# Patient Record
Sex: FEMALE | Race: BLACK OR AFRICAN AMERICAN | Employment: FULL TIME | URBAN - METROPOLITAN AREA
[De-identification: names, ages, dates, MRNs, and addresses within clinical notes are randomized per-mention and may not be internally consistent; named-entity substitution may affect disease eponyms.]

---

## 2017-02-16 ENCOUNTER — TELEPHONE (OUTPATIENT)
Dept: INTERNAL MEDICINE CLINIC | Age: 47
End: 2017-02-16

## 2017-02-27 ENCOUNTER — OFFICE VISIT (OUTPATIENT)
Dept: INTERNAL MEDICINE CLINIC | Age: 47
End: 2017-02-27

## 2017-02-27 VITALS
RESPIRATION RATE: 18 BRPM | TEMPERATURE: 98.7 F | WEIGHT: 263.6 LBS | SYSTOLIC BLOOD PRESSURE: 120 MMHG | HEART RATE: 94 BPM | DIASTOLIC BLOOD PRESSURE: 74 MMHG | BODY MASS INDEX: 48.51 KG/M2 | OXYGEN SATURATION: 98 % | HEIGHT: 62 IN

## 2017-02-27 DIAGNOSIS — G44.89 OTHER HEADACHE SYNDROME: ICD-10-CM

## 2017-02-27 DIAGNOSIS — J30.89 NON-SEASONAL ALLERGIC RHINITIS DUE TO OTHER ALLERGIC TRIGGER: ICD-10-CM

## 2017-02-27 DIAGNOSIS — E28.2 PCOS (POLYCYSTIC OVARIAN SYNDROME): Primary | ICD-10-CM

## 2017-02-27 DIAGNOSIS — R45.4 IRRITABILITY: ICD-10-CM

## 2017-02-27 DIAGNOSIS — Z12.31 ENCOUNTER FOR MAMMOGRAM TO ESTABLISH BASELINE MAMMOGRAM: ICD-10-CM

## 2017-02-27 DIAGNOSIS — R06.83 SNORING: ICD-10-CM

## 2017-02-27 DIAGNOSIS — E66.01 MORBID OBESITY WITH BMI OF 45.0-49.9, ADULT (HCC): ICD-10-CM

## 2017-02-27 RX ORDER — METFORMIN HYDROCHLORIDE 500 MG/1
TABLET, EXTENDED RELEASE ORAL
Qty: 60 TAB | Refills: 2 | Status: SHIPPED | OUTPATIENT
Start: 2017-02-27 | End: 2017-08-06 | Stop reason: SDUPTHER

## 2017-02-27 RX ORDER — CETIRIZINE HCL 10 MG
10 TABLET ORAL DAILY
Qty: 30 TAB | Refills: 1 | Status: SHIPPED | OUTPATIENT
Start: 2017-02-27 | End: 2022-08-04

## 2017-02-27 NOTE — PROGRESS NOTES
HISTORY OF PRESENT ILLNESS  Ashleigh Ureña is a 49 y.o. female. HPI  Cardiovascular Review:  The patient has obesity and PCOS . She went to [facility] obesity clinic records reviewed. Diet and Lifestyle: nonsmoker, plans to start a VLCD but has not started yet March 10th  Home BP Monitoring: is not measured at home. Pertinent ROS: not taking medications regularly as instructed- stopped metformin for a while but has restarted it, no medication side effects noted, no TIA's, no chest pain on exertion, no dyspnea on exertion, no swelling of ankles. Mood Disorder   Patient is seen for followup of irritability. High stress due to new job. Never been treated for mood disorder. she denies suicidal thoughts without plan. she experiences the following side effects from the treatment: none. PHQ 2 / 9, over the last two weeks 2/27/2017   Little interest or pleasure in doing things Not at all   Feeling down, depressed or hopeless Not at all   Total Score PHQ 2 0       SHx: New job  Review of Systems   Constitutional: Negative for chills, diaphoresis, fever, malaise/fatigue and weight loss. HENT: Positive for congestion. Negative for ear discharge, ear pain and sore throat. No sinus pain   Eyes: Negative for blurred vision and pain. Respiratory: Negative for cough, sputum production and shortness of breath. Cardiovascular: Negative for chest pain, orthopnea and leg swelling. Gastrointestinal: Negative for diarrhea, nausea and vomiting. Musculoskeletal: Positive for joint pain. Negative for myalgias. Skin: Negative for rash. Neurological: Negative for focal weakness and headaches. Psychiatric/Behavioral: Negative for depression.         Per hpi       Patient Active Problem List    Diagnosis Date Noted    Obesity 11/24/2014    GERD (gastroesophageal reflux disease) 11/24/2014    PCOS (polycystic ovarian syndrome) 11/24/2014       Current Outpatient Prescriptions   Medication Sig Dispense Refill  metFORMIN ER (GLUCOPHAGE XR) 500 mg tablet TAKE 2 TABS BY MOUTH DAILY (WITH DINNER). 60 Tab 0    MULTIVITAMIN PO Take 2 Tabs by mouth daily.  fluticasone (FLONASE) 50 mcg/actuation nasal spray 2 Sprays by Both Nostrils route daily. 1 Bottle 2    cetirizine (ZYRTEC) 10 mg tablet Take 1 Tab by mouth daily. 30 Tab 1       No Known Allergies   Visit Vitals    /74 (BP 1 Location: Right arm, BP Patient Position: Sitting)    Pulse 94    Temp 98.7 °F (37.1 °C) (Oral)    Resp 18    Ht 5' 2\" (1.575 m)    Wt 263 lb 9.6 oz (119.6 kg)    SpO2 98%    BMI 48.21 kg/m2       Physical Exam   Constitutional: She appears well-developed. No distress. HENT:   Nose: Mucosal edema present. No rhinorrhea or septal deviation. Right sinus exhibits no maxillary sinus tenderness and no frontal sinus tenderness. Left sinus exhibits no maxillary sinus tenderness and no frontal sinus tenderness. Mouth/Throat:       Eyes: Conjunctivae are normal.   Neck: Neck supple. Cardiovascular: Normal rate, regular rhythm and normal heart sounds. Pulmonary/Chest: Effort normal and breath sounds normal. No respiratory distress. She has no wheezes. She has no rales. She exhibits no tenderness. Lymphadenopathy:     She has no cervical adenopathy. ASSESSMENT and PLAN  Ke Wilcox was seen today for pcos. Diagnoses and all orders for this visit:    PCOS (polycystic ovarian syndrome)- continue metformin  -     cetirizine (ZYRTEC) 10 mg tablet; Take 1 Tab by mouth daily. -     metFORMIN ER (GLUCOPHAGE XR) 500 mg tablet; TAKE 2 TABS BY MOUTH DAILY (WITH DINNER). Allergic rhinitis- optimize sx. Allergic Rhinitis   -Use Flonase 1-2 sprays per nostril once a day  -Use nasal saline spray 3-4 times/day BEFORE Flonase  -Start taking a non sedating antihistamine such as Claritin, Allegra or Zyrtec (generic is fine)    -     cetirizine (ZYRTEC) 10 mg tablet; Take 1 Tab by mouth daily. Other headache syndrome- due to allergies. -     cetirizine (ZYRTEC) 10 mg tablet; Take 1 Tab by mouth daily. -     metFORMIN ER (GLUCOPHAGE XR) 500 mg tablet; TAKE 2 TABS BY MOUTH DAILY (WITH DINNER). Irritability- no si/ hi. Recommended pt try finding a therapist using the list provided and www. psychologytoday.com.  -     REFERRAL TO PSYCHOLOGY    Snoring- r/o JESSY at risk  -     SLEEP MEDICINE REFERRAL    Morbid obesity with BMI of 45.0-49.9, adult ([facility] [address]- I have reviewed/discussed the above normal BMI with the patient. I have recommended the following interventions: dietary management education, guidance, and counseling . The plan is as follows: Referral to [address] [place] [address]  I have counseled this patient on diet and exercise regimens. .        Encounter for mammogram to establish baseline mammogram  -     [facility] MAMMO BI SCREENING INCL CAD; Future      Follow-up Disposition:  Return in about 3 months (around 5/27/2017) for Physical - 30 minute appointment. Medication risks/benefits/costs/interactions/alternatives discussed with patient. Nader Hidalgo  was given an after visit summary which includes diagnoses, current medications, & vitals. she expressed understanding with the diagnosis and plan.

## 2017-02-27 NOTE — MR AVS SNAPSHOT
Visit Information Date & Time Provider Department Dept. Phone Encounter #  
 2/27/2017  1:30 PM Erna Narvaez MD [facility] Internal Medicine [phone] [id number] Follow-up Instructions Return in about 3 months (around 5/27/2017) for Physical - 30 minute appointment. Upcoming Health Maintenance Date Due  
 BREAST CANCER SCRN MAMMOGRAM 12/4/2015 INFLUENZA AGE 9 TO ADULT 8/1/2016 PAP AKA CERVICAL CYTOLOGY 2/11/2018 DTaP/Tdap/Td series (2 - Td) 4/20/2026 Allergies as of 2/27/2017  Review Complete On: 2/27/2017 By: Erna Narvaez MD  
 No Known Allergies Current Immunizations  Reviewed on 8/5/2015 Name Date Influenza Vaccine 10/15/2014 Tdap 4/20/2016 Not reviewed this visit You Were Diagnosed With   
  
 Codes Comments PCOS (polycystic ovarian syndrome)    -  Primary ICD-10-CM: E28.2 ICD-9-CM: 256.4 Other headache syndrome     ICD-10-CM: G44.89 ICD-9-CM: 339.89 Irritability     ICD-10-CM: R45.4 ICD-9-CM: 799.22 Snoring     ICD-10-CM: R06.83 
ICD-9-CM: 786.09 Morbid obesity with BMI of 45.0-49.9, adult (HCC)     ICD-10-CM: E66.01, Z68.42 
ICD-9-CM: 278.01, V85.42 Encounter for mammogram to establish baseline mammogram     ICD-10-CM: Z12.31 
ICD-9-CM: V76.12 Non-seasonal allergic rhinitis due to other allergic trigger     ICD-10-CM: J30.89 Vitals BP  
  
  
  
  
  
 120/74 (BP 1 Location: Right arm, BP Patient Position: Sitting) Vitals History BMI and BSA Data Body Mass Index Body Surface Area  
 48.21 kg/m 2 2.29 m 2 Preferred Pharmacy Pharmacy Name Phone [id number] [address] [phone] Your Updated Medication List  
  
   
This list is accurate as of: 2/27/17  2:03 PM.  Always use your most recent med list.  
  
  
  
  
 cetirizine 10 mg tablet Commonly known as:  ZYRTEC Take 1 Tab by mouth daily. fluticasone 50 mcg/actuation nasal spray Commonly known as:  Anderson Arshad 2 Sprays by Both Nostrils route daily. metFORMIN  mg tablet Commonly known as:  GLUCOPHAGE XR  
TAKE 2 TABS BY MOUTH DAILY (WITH DINNER). MULTIVITAMIN PO Take 2 Tabs by mouth daily. Prescriptions Sent to Pharmacy Refills  
 cetirizine (ZYRTEC) 10 mg tablet 1 Sig: Take 1 Tab by mouth daily. Class: Normal  
 Pharmacy: [address] [place] [address] Ph #: [phone] Route: Oral  
 metFORMIN ER (GLUCOPHAGE XR) 500 mg tablet 2 Sig: TAKE 2 TABS BY MOUTH DAILY (WITH DINNER). Class: Normal  
 Pharmacy: [address] [place] [address] Ph #: [phone] We Performed the Following REFERRAL TO PSYCHOLOGY [YLX47 Custom] SLEEP MEDICINE REFERRAL [ICH086 Custom] Comments:  
 Orders: 
Direct Referral for Study - Please arrange a sleep study consult only if sleep study is positive. Follow-up Instructions Return in about 3 months (around 5/27/2017) for Physical - 30 minute appointment. To-Do List   
 02/27/2017 Imaging:  [facility] MAMMO BI SCREENING INCL CAD Referral Information Referral ID Referred By Referred To [id number] Berenice Hendrickson, [address] [place] [address] Phone: [phone] Visits Status Start Date End Date 1 New Request 2/27/17 2/27/18 If your referral has a status of pending review or denied, additional information will be sent to support the outcome of this decision. Referral ID Referred By Referred To [id number] Nicanor Carbajal MD  
   [address] [place] [address] Phone: [phone] Fax: [phone] Visits Status Start Date End Date 1 New Request 2/27/17 2/27/18 If your referral has a status of pending review or denied, additional information will be sent to support the outcome of this decision. Patient Instructions It was a pleasure to see you! As discussed: Allergic Rhinitis  
-Use Flonase 1-2 sprays per nostril once a day 
-Use nasal saline spray 3-4 times/day BEFORE Flonase 
-Start taking a non sedating antihistamine such as Claritin, Allegra or Zyrtec (generic is fine) Congratulations on your weight loss and positive lifestyle changes!!! 
I recommend you Schedule a sleep study Try finding a therapist using the list provided and www. psychologytoIdeal Binary.tuta.co. GABY MACHADO SHAUNA [facility] Address: [facility] [place], [address] [phone] Marilyn [facility] 
[address] [address] Minnie Chavis 
[phone] In the interim try the suggestions below Work-Life Balance: Care Instructions Your Care Instructions Do you ever feel like there is not enough time to do all of the things you have to do, and no time at all for the things you enjoy? If so, you are not alone. On average, people in the [place] have worked more and more hours each year since 1970. But in recent years, fewer people say they want to take on more at work, even if they would get promoted or get paid more money. More and more workers say they want time to spend with their families and to do things that are important to them. Do you ever feel: · That you always have more and more work to do at your job? · That too many people depend on you every day? · That you never have enough time for your family or friends? · That you never have time for hobbies or things you enjoy? · That each second of your day is scheduled? If you answered \"yes\" to any of these questions, take steps at work and at home to get your life into balance. Follow-up care is a key part of your treatment and safety.  Be sure to make and go to all appointments, and call your doctor if you are having problems. How can you care for yourself at home? Manage your time · Focus on the important things. Taking on too much can wear you out. Look at how you spend your time, and redirect your focus. Learn to say \"no\" and let go of things that do not matter. · Set one small goal at a time. Use a day planner. Break large projects into smaller ones. · Ask for help. Let your children, your spouse, your coworkers, and other people in your life help you get things done. · Leave your job at the office. If you give up free time to get more work done, you may pay for it with stress. If your job offers a flexible work schedule, use it to fit your own work style. For instance, come in earlier to have a longer lunch break, or make time for a yoga class or workout during your workday. · Unplug. Do not let technology (such as your cell phone or the Internet) erase the line between your time and your employer's time. Lower job stress Job stress causes trouble at work and at home. At work, you may worry about things you have not had time to do at home. At home, you may worry about your job. This cycle upsets your work-life balance. Lowering your job stress can get your life back in balance. Job stress can be caused by: · Pressure and deadlines. · Heavy workloads or long hours. · Not being allowed to make decisions. · Health and safety hazards. · Feeling you may lose your job. · Unclear or changing job duties. · Too much responsibility. · Work that is very tiring or boring. Do any of these things bother you? Consider talking with your boss to change things. There are some things that you may not be able to control. But even a few small changes might help lower your stress. Take advantage of programs at work Businesses make money and are better off in other ways if their employees are healthy and happy.  For this reason, many companies have programs to help balance work life and home life. These programs may include: · Flexible schedules and hours. · Time off for family reasons, education, or community service. · Being able to work from home. · Employee assistance programs to provide counseling. · Child-care programs. Check to see if your company has any of these or other programs that could help you. If not, consider talking to your boss about why work-life balance programs make good business sense. Even if your company does not start an official program, you may be able to get flexible hours, time off, or the ability to do some work from home. Know when to quit If you are truly unhappy because of a stressful job, and if the suggestions here have not worked, it may be time to think about changing jobs or changing careers. But before you quit, take time to research your options. Where can you learn more? Go to http://henrietta-dwight.info/. Enter G672 in the search box to learn more about \"Work-Life Balance: Care Instructions. \" Current as of: July 26, 2016 Content Version: 11.1 © 5016-7647 Healthwise, Incorporated. Care instructions adapted under license by [employer] (which disclaims liability or warranty for this information). If you have questions about a medical condition or this instruction, always ask your healthcare professional. [address] any warranty or liability for your use of this information. Introducing [facility]! Dear Cristian Youngblood: Thank you for requesting a Me[employer] account. Our records indicate that you already have an active Me[employer] account. You can access your account anytime at [email or web address] Did you know that you can access your hospital and ER discharge instructions at any time in Me[employer]urMobile? You can also review all of your test results from your hospital stay or ER visit. Additional Information If you have questions, please visit the Frequently Asked Questions section of the [employer] website at [email or web address] Remember, [employer] is NOT to be used for urgent needs. For medical emergencies, dial 911. Now available from your iPhone and Android! Please provide this summary of care documentation to your next provider. Your primary care clinician is listed as Eli Dillard. If you have any questions after today's visit, please call [phone].

## 2017-03-13 ENCOUNTER — HOSPITAL ENCOUNTER (OUTPATIENT)
Dept: MAMMOGRAPHY | Age: 47
Discharge: HOME | End: 2017-03-13
Attending: INTERNAL MEDICINE
Payer: COMMERCIAL

## 2017-03-13 DIAGNOSIS — Z12.31 ENCOUNTER FOR MAMMOGRAM TO ESTABLISH BASELINE MAMMOGRAM: ICD-10-CM

## 2017-03-13 PROCEDURE — 77067 SCR MAMMO BI INCL CAD: CPT

## 2017-03-14 NOTE — PROGRESS NOTES
Reviewed. Normal. Patient is to be contacted by Radiology. --  Dear Feliberto Johnson   Thank you for completing your mammogram.  Please find your most recent results below. Your results are normal. We will repeat the screening in 1 year. In the interim, continue to perform monthly self breast exams and notify me if you have any suspicious findings. Do not hesitate to contact the office if you have any questions or concerns before your next appointment.      Kind regards,   Dr. Tana Kocher    ----  Caryle Macho, MD  Internal Medicine/ [facility] [address] [place] [address] Internal Medicine   [address], [address] [address]  Office: [phone]

## 2017-06-02 ENCOUNTER — OFFICE VISIT (OUTPATIENT)
Dept: INTERNAL MEDICINE CLINIC | Age: 47
End: 2017-06-02

## 2017-06-02 VITALS
HEIGHT: 63 IN | DIASTOLIC BLOOD PRESSURE: 80 MMHG | WEIGHT: 263.4 LBS | RESPIRATION RATE: 18 BRPM | TEMPERATURE: 98.4 F | BODY MASS INDEX: 46.67 KG/M2 | SYSTOLIC BLOOD PRESSURE: 112 MMHG | HEART RATE: 81 BPM | OXYGEN SATURATION: 98 %

## 2017-06-02 DIAGNOSIS — G47.00 INSOMNIA, UNSPECIFIED TYPE: ICD-10-CM

## 2017-06-02 DIAGNOSIS — E66.01 OBESITY, MORBID, BMI 40.0-49.9 (HCC): ICD-10-CM

## 2017-06-02 DIAGNOSIS — E28.2 PCOS (POLYCYSTIC OVARIAN SYNDROME): ICD-10-CM

## 2017-06-02 DIAGNOSIS — Z00.00 WELL WOMAN EXAM (NO GYNECOLOGICAL EXAM): Primary | ICD-10-CM

## 2017-06-02 NOTE — PROGRESS NOTES
HISTORY OF PRESENT ILLNESS  Altaf Mota is a 49 y.o. female. HPI   Health Maintenance   Topic Date Due    INFLUENZA AGE 9 TO ADULT  08/01/2017    PAP AKA CERVICAL CYTOLOGY  02/11/2018    BREAST CANCER SCRN MAMMOGRAM  03/13/2018    DTaP/Tdap/Td series (2 - Td) 04/20/2026     Cardiovascular Review:  The patient has obesity Body mass index is 46.09 kg/(m^2). has joined [facility] Loss program but is having difficulty  and PCOS. Diet and Lifestyle: nonsmoker   Sleep: Not sleeping well.    Stress:   Home BP Monitoring: is not measured at home. Pertinent ROS: taking medications as instructed, no medication side effects noted, no TIA's, no chest pain on exertion, no dyspnea on exertion, no swelling of ankles. Review of Systems   Constitutional: Negative for chills, fever and weight loss. HENT: Negative for congestion and sore throat. Eyes: Negative for blurred vision and double vision. Respiratory: Negative for cough and shortness of breath. Cardiovascular: Negative for chest pain, orthopnea and leg swelling. Gastrointestinal: Negative for abdominal pain, blood in stool, constipation, diarrhea, heartburn, nausea and vomiting. Genitourinary: Negative for frequency and urgency. Musculoskeletal: Negative for joint pain and myalgias. Neurological: Negative for dizziness, tremors, weakness and headaches. Endo/Heme/Allergies: Does not bruise/bleed easily. Psychiatric/Behavioral: Negative for depression and suicidal ideas. Patient Active Problem List    Diagnosis Date Noted    Obesity 11/24/2014    GERD (gastroesophageal reflux disease) 11/24/2014    PCOS (polycystic ovarian syndrome) 11/24/2014       Current Outpatient Prescriptions   Medication Sig Dispense Refill    cetirizine (ZYRTEC) 10 mg tablet Take 1 Tab by mouth daily. 30 Tab 1    metFORMIN ER (GLUCOPHAGE XR) 500 mg tablet TAKE 2 TABS BY MOUTH DAILY (WITH DINNER).  60 Tab 2    MULTIVITAMIN PO Take 2 Tabs by mouth daily.      fluticasone (FLONASE) 50 mcg/actuation nasal spray 2 Sprays by Both Nostrils route daily. 1 Bottle 2       No Known Allergies   Visit Vitals    /80 (BP 1 Location: Right arm, BP Patient Position: Sitting)    Pulse 81    Temp 98.4 °F (36.9 °C) (Oral)    Resp 18    Ht 5' 3.39\" (1.61 m)    Wt 263 lb 6.4 oz (119.5 kg)    SpO2 98%    BMI 46.09 kg/m2       Physical Exam   Constitutional: She is oriented to person, place, and time. She appears well-developed and well-nourished. No distress. HENT:   Right Ear: External ear normal.   Left Ear: External ear normal.   Mouth/Throat: Oropharynx is clear and moist. No oropharyngeal exudate. Eyes: Conjunctivae are normal. No scleral icterus. Neck: Normal range of motion. Neck supple. No thyromegaly present. Cardiovascular: Normal rate, regular rhythm and normal heart sounds. Exam reveals no gallop and no friction rub. No murmur heard. Pulmonary/Chest: Effort normal and breath sounds normal. No respiratory distress. She has no wheezes. She has no rales. She exhibits no tenderness. Abdominal: Soft. Bowel sounds are normal. She exhibits no distension. There is no tenderness. There is no rebound and no guarding. Genitourinary: Rectal exam shows guaiac negative stool. Genitourinary Comments: Up to date, completed by gynecologist.       Musculoskeletal: Normal range of motion. She exhibits no edema or tenderness. Lymphadenopathy:     She has no cervical adenopathy. Neurological: She is alert and oriented to person, place, and time. KHADRA and Ray Janie was seen today for complete physical.    Diagnoses and all orders for this visit:    Well woman exam (no gynecological exam)- Health Maintenance reviewed - labs ordered .     -     CBC WITH AUTOMATED DIFF  -     LIPID PANEL  -     METABOLIC PANEL, COMPREHENSIVE  -     HEMOGLOBIN A1C WITH EAG  -     THYROID PANEL  -     TSH 3RD GENERATION  -     VITAMIN D, 25 HYDROXY    Insomnia, unspecified type- at risk for JESSY. Sleep study reordered  -     SLEEP MEDICINE REFERRAL    PCOS (polycystic ovarian syndrome)- continue metformin while working on diet and weight loss optimization    Obesity, morbid, BMI 40.0-49.9 ([facility] [address]-   Three Goals (Big Goal: at least  3-5 lbs weight loss by next visit)  1. End Ketogenic Diet  2. Schedule appointment with Weight Management Program   3. Optimize Stress: Return to [facility] to discuss coping strategies to discuss how to deal with your supervisor. In the interim reflect on why your supervisor bothers you enough to affect your desire to stay at a job you love. I have reviewed/discussed the above normal BMI with the patient. I have recommended the following interventions: dietary management education, guidance, and counseling . .      -     THYROID PANEL  -     TSH 3RD GENERATION      Follow-up Disposition:  Return in about 4 months (around 10/2/2017) for Follow-up, Weight Management, PCOS. Medication risks/benefits/costs/interactions/alternatives discussed with patient. Rianna Elaine  was given an after visit summary which includes diagnoses, current medications, & vitals. she expressed understanding with the diagnosis and plan.

## 2017-06-02 NOTE — PROGRESS NOTES
Chief Complaint   Patient presents with    Complete Physical     1. Have you been to the ER, urgent care clinic since your last visit? Hospitalized since your last visit? No    2. Have you seen or consulted any other health care providers outside of the [address] [place] [address] since your last visit? Include any pap smears or colon screening.  Yes When: 3/2017 Where: Weight loss clinic/[facility] Reason for visit: initial visit

## 2017-06-02 NOTE — MR AVS SNAPSHOT
Visit Information Date & Time Provider Department Dept. Phone Encounter #  
 6/2/2017 10:00 AM Candis Amor MD [facility] Internal Medicine [phone] [id number] Follow-up Instructions Return in about 4 months (around 10/2/2017) for Follow-up, Weight Management, PCOS. Upcoming Health Maintenance Date Due INFLUENZA AGE 9 TO ADULT 8/1/2017 PAP AKA CERVICAL CYTOLOGY 2/11/2018 BREAST CANCER SCRN MAMMOGRAM 3/13/2018 DTaP/Tdap/Td series (2 - Td) 4/20/2026 Allergies as of 6/2/2017  Review Complete On: 6/2/2017 By: Candis Amor MD  
 No Known Allergies Current Immunizations  Reviewed on 8/5/2015 Name Date Influenza Vaccine 10/15/2014 Tdap 4/20/2016 Not reviewed this visit You Were Diagnosed With   
  
 Codes Comments Well woman exam (no gynecological exam)    -  Primary ICD-10-CM: Z00.00 ICD-9-CM: V70.0 Insomnia, unspecified type     ICD-10-CM: G47.00 ICD-9-CM: 780.52   
 PCOS (polycystic ovarian syndrome)     ICD-10-CM: E28.2 ICD-9-CM: 256.4 Obesity, morbid, BMI 40.0-49.9 (HCC)     ICD-10-CM: E66.01 
ICD-9-CM: 278.01 Vitals BP Pulse Temp Resp Height(growth percentile) Weight(growth percentile) 112/80 (BP 1 Location: Right arm, BP Patient Position: Sitting) 81 98.4 °F (36.9 °C) (Oral) 18 5' 3.39\" (1.61 m) 263 lb 6.4 oz (119.5 kg) LMP SpO2 BMI OB Status Smoking Status 05/24/2017 98% 46.09 kg/m2 Having regular periods Never Smoker Vitals History BMI and BSA Data Body Mass Index Body Surface Area 46.09 kg/m 2 2.31 m 2 Preferred Pharmacy Pharmacy Name Phone [id number] Eduardo Niki, [address] [place] [address] [phone] Your Updated Medication List  
  
   
This list is accurate as of: 6/2/17 11:01 AM.  Always use your most recent med list.  
  
  
  
  
 cetirizine 10 mg tablet Commonly known as:  ZYRTEC Take 1 Tab by mouth daily. fluticasone 50 mcg/actuation nasal spray Commonly known as:  [place] Miki 2 Sprays by Both Nostrils route daily. metFORMIN  mg tablet Commonly known as:  GLUCOPHAGE XR  
TAKE 2 TABS BY MOUTH DAILY (WITH DINNER). MULTIVITAMIN PO Take 2 Tabs by mouth daily. We Performed the Following CBC WITH AUTOMATED DIFF [59884 CPT(R)] HEMOGLOBIN A1C WITH EAG [65638 CPT(R)] LIPID PANEL [86423 CPT(R)] METABOLIC PANEL, COMPREHENSIVE [31545 CPT(R)] SLEEP MEDICINE REFERRAL [EOP617 Custom] Comments:  
 Orders: 
Direct Referral for Study - Please arrange a sleep study consult only if sleep study is positive. THYROID PANEL [id number] CPT(R)] TSH 3RD GENERATION [03751 CPT(R)] VITAMIN D, 25 HYDROXY T2009100 CPT(R)] Follow-up Instructions Return in about 4 months (around 10/2/2017) for Follow-up, Weight Management, PCOS. Referral Information Referral ID Referred By Referred To  
  
 [id number] [address] Phone: [phone] Fax: [phone] Visits Status Start Date End Date 1 New Request 6/2/17 6/2/18 If your referral has a status of pending review or denied, additional information will be sent to support the outcome of this decision. Patient Instructions It was a pleasure to see you! As discussed: 
 
I have ordered your age appropriate labs please complete them. You will need to fast 10-12hrs before your lab appointment. Complete labs two weeks before your next appointment. Three Goals (Big Goal: at least  3-5 lbs weight loss by next visit) 1. End Ketogenic Diet 2. Schedule appointment with Weight Management Program  
3. Optimize Stress: Return to EAP to discuss coping strategies to discuss how to deal with your supervisor.  In the interim reflect on why your supervisor bothers you enough to affect your desire to stay at a job you love. WEIGHT LOSS RECOMMENDATIONS: 
 
Percy Soliz:  
            - [address] [place], [place] 
 - [phone] 
 - [email or web address] 
 - 3 month initial course - Initial fee + monthly membership fee [facility] - Dr Amy Canas 
 - Ted Sweet. [facility] [phone] - [email or web address] 
 - Cost: $135 initial visit, $65 follow up visits Weight Watchers: 
 - See website Veronica Cox: - See website New Technology: - My Fitness Pal or other Apps for your phone - FitBit or FuelBand Medications: 
 - Phentermine - Qsymia - Belviq Aerobic exercise: goal of 3-5 times per week, about 30 minutes Diet changes: limiting daily calorie intake to 2,000. Work on reading nutrition labels on food (in particular the serving size, the calories per serving, and carbohydrates). Starting a Weight Loss Plan: After Your Visit Your Care Instructions If you are thinking about losing weight, it can be hard to know where to start. Your doctor can help you set up a weight loss plan that best meets your needs. You may want to take a class on nutrition or exercise, or join a weight loss support group. If you have questions about how to make changes to your eating or exercise habits, ask your doctor about seeing a registered dietitian or an exercise specialist. 
It can be a big challenge to lose weight. But you do not have to make huge changes at once. Make small changes, and stick with them. When those changes become habit, add a few more changes. If you do not think you are ready to make changes right now, try to pick a date in the future. Make an appointment to see your doctor to discuss whether the time is right for you to start a plan. Follow-up care is a key part of your treatment and safety.  Be sure to make and go to all appointments, and call your doctor if you are having problems. Its also a good idea to know your test results and keep a list of the medicines you take. How can you care for yourself at home? · Set realistic goals. Many people expect to lose much more weight than is likely. A weight loss of 5% to 10% of your body weight may be enough to improve your health. · Get family and friends involved to provide support. Talk to them about why you are trying to lose weight, and ask them to help. They can help by participating in exercise and having meals with you, even if they may be eating something different. · Find what works best for you. If you do not have time or do not like to cook, a program that offers meal replacement bars or shakes may be better for you. Or if you like to prepare meals, finding a plan that includes daily menus and recipes may be best. 
· Ask your doctor about other health professionals who can help you achieve your weight loss goals. ¨ A dietitian can help you make healthy changes in your diet. ¨ An exercise specialist or  can help you develop a safe and effective exercise program. 
¨ A counselor or psychiatrist can help you cope with issues such as depression, anxiety, or family problems that can make it hard to focus on weight loss. · Consider joining a support group for people who are trying to lose weight. Your doctor can suggest groups in your area. Where can you learn more? Go to Button.be Enter T515 in the search box to learn more about \"Starting a Weight Loss Plan: After Your Visit. \"  
© 8922-2705 [employer]. Care instructions adapted under license by Tamika Keita (which disclaims liability or warranty for this information).  This care instruction is for use with your licensed healthcare professional. If you have questions about a medical condition or this instruction, always ask your healthcare professional. [address] any warranty or liability for your use of this information. Content Version: 24.4.356044; Last Revised: August 6, 2013 Introducing [facility]! Dear Bart: Thank you for requesting a PAAY account. Our records indicate that you already have an active PAAY account. You can access your account anytime at [email or web address] Did you know that you can access your hospital and ER discharge instructions at any time in PAAY? You can also review all of your test results from your hospital stay or ER visit. Additional Information If you have questions, please visit the Frequently Asked Questions section of the PAAY website at [email or web address] Remember, PAAY is NOT to be used for urgent needs. For medical emergencies, dial 911. Now available from your iPhone and Android! Please provide this summary of care documentation to your next provider. Your primary care clinician is listed as Shyla No. If you have any questions after today's visit, please call [phone].

## 2017-06-02 NOTE — PATIENT INSTRUCTIONS
It was a pleasure to see you! As discussed:    I have ordered your age appropriate labs please complete them. You will need to fast 10-12hrs before your lab appointment. Complete labs two weeks before your next appointment. Three Goals (Big Goal: at least  3-5 lbs weight loss by next visit)  1. End Ketogenic Diet  2. Schedule appointment with Weight Management Program   3. Optimize Stress: Return to EA to discuss coping strategies to discuss how to deal with your supervisor. In the interim reflect on why your supervisor bothers you enough to affect your desire to stay at a job you love. WEIGHT LOSS RECOMMENDATIONS:    Pat Comment:               - [address] [facility]. [place], [place]   - [phone]   - [email or web address]   - 3 month initial course   - Initial fee + monthly membership fee    [facility]   - Dr Christiano Orozco   - Darleen Albert. [place], [place]   - [phone]   - www. [email or web address]   - Cost: $135 initial visit, $65 follow up visits    Weight Watchers:   - See website    Romulo Arellano:   - See website    New Technology:   - My Terryann Nailer or other Apps for your phone   - FitBit or FuelBand    Medications:   - Phentermine    - Qsymia   - Belviq      Aerobic exercise: goal of 3-5 times per week, about 30 minutes    Diet changes: limiting daily calorie intake to 2,000. Work on reading nutrition labels on food (in particular the serving size, the calories per serving, and carbohydrates). Starting a Weight Loss Plan: After Your Visit  Your Care Instructions  If you are thinking about losing weight, it can be hard to know where to start. Your doctor can help you set up a weight loss plan that best meets your needs. You may want to take a class on nutrition or exercise, or join a weight loss support group.  If you have questions about how to make changes to your eating or exercise habits, ask your doctor about seeing a registered dietitian or an exercise specialist.  It can be a big challenge to lose weight. But you do not have to make huge changes at once. Make small changes, and stick with them. When those changes become habit, add a few more changes. If you do not think you are ready to make changes right now, try to pick a date in the future. Make an appointment to see your doctor to discuss whether the time is right for you to start a plan. Follow-up care is a key part of your treatment and safety. Be sure to make and go to all appointments, and call your doctor if you are having problems. Its also a good idea to know your test results and keep a list of the medicines you take. How can you care for yourself at home? · Set realistic goals. Many people expect to lose much more weight than is likely. A weight loss of 5% to 10% of your body weight may be enough to improve your health. · Get family and friends involved to provide support. Talk to them about why you are trying to lose weight, and ask them to help. They can help by participating in exercise and having meals with you, even if they may be eating something different. · Find what works best for you. If you do not have time or do not like to cook, a program that offers meal replacement bars or shakes may be better for you. Or if you like to prepare meals, finding a plan that includes daily menus and recipes may be best.  · Ask your doctor about other health professionals who can help you achieve your weight loss goals. ¨ A dietitian can help you make healthy changes in your diet. ¨ An exercise specialist or  can help you develop a safe and effective exercise program.  ¨ A counselor or psychiatrist can help you cope with issues such as depression, anxiety, or family problems that can make it hard to focus on weight loss. · Consider joining a support group for people who are trying to lose weight. Your doctor can suggest groups in your area. Where can you learn more?    Go to DealExplorer.be  Enter A715 in the search box to learn more about \"Starting a Weight Loss Plan: After Your Visit. \"   © 0324-4483 [employer], Incorporated. Care instructions adapted under license by [facility] (which disclaims liability or warranty for this information). This care instruction is for use with your licensed healthcare professional. If you have questions about a medical condition or this instruction, always ask your healthcare professional. [place] [address] any warranty or liability for your use of this information. Content Version: 05.6.919854;  Last Revised: August 6, 2013

## 2017-08-06 DIAGNOSIS — E28.2 PCOS (POLYCYSTIC OVARIAN SYNDROME): ICD-10-CM

## 2017-08-07 RX ORDER — METFORMIN HYDROCHLORIDE 500 MG/1
TABLET, EXTENDED RELEASE ORAL
Qty: 60 TAB | Refills: 2 | Status: SHIPPED | OUTPATIENT
Start: 2017-08-07 | End: 2017-09-29 | Stop reason: SDUPTHER

## 2017-09-25 ENCOUNTER — OFFICE VISIT (OUTPATIENT)
Dept: SLEEP MEDICINE | Age: 47
End: 2017-09-25

## 2017-09-25 VITALS
OXYGEN SATURATION: 99 % | HEIGHT: 63 IN | HEART RATE: 66 BPM | DIASTOLIC BLOOD PRESSURE: 77 MMHG | SYSTOLIC BLOOD PRESSURE: 112 MMHG | BODY MASS INDEX: 47.38 KG/M2 | WEIGHT: 267.4 LBS

## 2017-09-25 DIAGNOSIS — G47.33 OBSTRUCTIVE SLEEP APNEA (ADULT) (PEDIATRIC): Primary | ICD-10-CM

## 2017-09-25 DIAGNOSIS — E66.01 MORBID OBESITY WITH BMI OF 45.0-49.9, ADULT (HCC): ICD-10-CM

## 2017-09-25 RX ORDER — RANITIDINE 150 MG/1
150 TABLET, FILM COATED ORAL
COMMUNITY
End: 2022-08-04

## 2017-09-25 NOTE — PATIENT INSTRUCTIONS
[address] [facility] Kaitlyn Adan [address], [address]  Tel.  [phone]  Fax. 100 [facility] 60  [place], [address] [facility]  Tel.  [phone]  Fax. [phone] [address]  Tel.  [phone]  Fax. [phone]     Sleep Apnea: After Your Visit  Your Care Instructions  Sleep apnea occurs when you frequently stop breathing for 10 seconds or longer during sleep. It can be mild to severe, based on the number of times per hour that you stop breathing or have slowed breathing. Blocked or narrowed airways in your nose, mouth, or throat can cause sleep apnea. Your airway can become blocked when your throat muscles and tongue relax during sleep. Sleep apnea is common, occurring in 1 out of 20 individuals. Individuals having any of the following characteristics should be evaluated and treated right away due to high risk and detrimental consequences from untreated sleep apnea:  1. Obesity  2. Congestive Heart failure  3. Atrial Fibrillation  4. Uncontrolled Hypertension  5. Type II Diabetes  6. Night-time Arrhythmias  7. Stroke  8. Pulmonary Hypertension  9. High-risk Driving Populations (pilots, truck drivers, etc.)  10. Patients Considering Weight-loss Surgery    How do you know you have sleep apnea? You probably have sleep apnea if you answer 'yes' to 3 or more of the following questions:  S - Have you been told that you Snore? T - Are you often Tired during the day? O - Has anyone Observed you stop breathing while sleeping? P- Do you have (or are being treated for) high blood Pressure? B - Are you obese (Body Mass Index > 35)? A - Is your Age 48years old or older? N - Is your Neck size greater than 16 inches? G - Are you male Gender? A sleep physician can prescribe a breathing device that prevents tissues in the throat from blocking your airway.  Or your doctor may recommend using a dental device (oral breathing device) to help keep your airway open. In some cases, surgery may be needed to remove enlarged tissues in the throat. Follow-up care is a key part of your treatment and safety. Be sure to make and go to all appointments, and call your doctor if you are having problems. It's also a good idea to know your test results and keep a list of the medicines you take. How can you care for yourself at home? · Lose weight, if needed. It may reduce the number of times you stop breathing or have slowed breathing. · Go to bed at the same time every night. · Sleep on your side. It may stop mild apnea. If you tend to roll onto your back, sew a pocket in the back of your pajama top. Put a tennis ball into the pocket, and stitch the pocket shut. This will help keep you from sleeping on your back. · Avoid alcohol and medicines such as sleeping pills and sedatives before bed. · Do not smoke. Smoking can make sleep apnea worse. If you need help quitting, talk to your doctor about stop-smoking programs and medicines. These can increase your chances of quitting for good. · Prop up the head of your bed 4 to 6 inches by putting bricks under the legs of the bed. · Treat breathing problems, such as a stuffy nose, caused by a cold or allergies. · Use a continuous positive airway pressure (CPAP) breathing machine if lifestyle changes do not help your apnea and your doctor recommends it. The machine keeps your airway from closing when you sleep. · If CPAP does not help you, ask your doctor whether you should try other breathing machines. A bilevel positive airway pressure machine has two types of air pressureâone for breathing in and one for breathing out. Another device raises or lowers air pressure as needed while you breathe. · If your nose feels dry or bleeds when using one of these machines, talk with your doctor about increasing moisture in the air. A humidifier may help.   · If your nose is runny or stuffy from using a breathing machine, talk with your doctor about using decongestants or a corticosteroid nasal spray. When should you call for help? Watch closely for changes in your health, and be sure to contact your doctor if:  · You still have sleep apnea even though you have made lifestyle changes. · You are thinking of trying a device such as CPAP. · You are having problems using a CPAP or similar machine. Where can you learn more? Go to Fundrise. Enter N863 in the search box to learn more about \"Sleep Apnea: After Your Visit. \"   © 9244-9076 Healthwise, Incorporated. Care instructions adapted under license by [facility] [employer] (which disclaims liability or warranty for this information). This care instruction is for use with your licensed healthcare professional. If you have questions about a medical condition or this instruction, always ask your healthcare professional. [place] any warranty or liability for your use of this information. PROPER SLEEP HYGIENE    What to avoid  · Do not have drinks with caffeine, such as coffee or black tea, for 8 hours before bed. · Do not smoke or use other types of tobacco near bedtime. Nicotine is a stimulant and can keep you awake. · Avoid drinking alcohol late in the evening, because it can cause you to wake in the middle of the night. · Do not eat a big meal close to bedtime. If you are hungry, eat a light snack. · Do not drink a lot of water close to bedtime, because the need to urinate may wake you up during the night. · Do not read or watch TV in bed. Use the bed only for sleeping and sexual activity. What to try  · Go to bed at the same time every night, and wake up at the same time every morning. Do not take naps during the day. · Keep your bedroom quiet, dark, and cool. · Get regular exercise, but not within 3 to 4 hours of your bedtime. .  · Sleep on a comfortable pillow and mattress.   · If watching the clock makes you anxious, turn it facing away from you so you cannot see the time. · If you worry when you lie down, start a worry book. Well before bedtime, write down your worries, and then set the book and your concerns aside. · Try meditation or other relaxation techniques before you go to bed. · If you cannot fall asleep, get up and go to another room until you feel sleepy. Do something relaxing. Repeat your bedtime routine before you go to bed again. · Make your house quiet and calm about an hour before bedtime. Turn down the lights, turn off the TV, log off the computer, and turn down the volume on music. This can help you relax after a busy day. Drowsy Driving  The [address] [place] [address] Road Traffic Safety Administration cites drowsiness as a causing factor in more than 408,355 police reported crashes annually, resulting in 76,000 injuries and 1,500 deaths. Other surveys suggest 55% of people polled have driven while drowsy in the past year, 23% had fallen asleep but not crashed, 3% crashed, and 2% had and accident due to drowsy driving. Who is at risk? Young Drivers: One study of drowsy driving accidents states that 55% of the drivers were under 25 years. Of those, 75% were male. Shift Workers and Travelers: People who work overnight or travel across time zones frequently are at higher risk of experiencing Circadian Rhythm Disorders. They are trying to work and function when their body is programed to sleep. Sleep Deprived: Lack of sleep has a serious impact on your ability to pay attention or focus on a task. Consistently getting less than the average of 8 hours your body needs creates partial or cumulative sleep deprivation. Untreated Sleep Disorders: Sleep Apnea, Narcolepsy, R.L.S., and other sleep disorders (untreated) prevent a person from getting enough restful sleep. This leads to excessive daytime sleepiness and increases the risk for drowsy driving accidents by up to 7 times.   Medications / Alcohol: Even over the counter medications can cause drowsiness. Medications that impair a drivers attention should have a warning label. Alcohol naturally makes you sleepy and on its own can cause accidents. Combined with excessive drowsiness its effects are amplified. Signs of Drowsy Driving:   * You don't remember driving the last few miles   * You may drift out of your dagmar   * You are unable to focus and your thoughts wander   * You may yawn more often than normal   * You have difficulty keeping your eyes open / nodding off   * Missing traffic signs, speeding, or tailgating  Prevention-   Good sleep hygiene, lifestyle and behavioral choices have the most impact on drowsy driving. There is no substitute for sleep and the average person requires 8 hours nightly. If you find yourself driving drowsy, stop and sleep. Consider the sleep hygiene tips provided during your visit as well. Medication Refill Policy: Refills for all medications require 1 week advance notice. Please have your pharmacy fax a refill request. We are unable to fax, or call in \"controled substance\" medications and you will need to pick these prescriptions up from our office. eEvent Activation    Thank you for requesting access to eEvent. Please follow the instructions below to securely access and download your online medical record. eEvent allows you to send messages to your doctor, view your test results, renew your prescriptions, schedule appointments, and more. How Do I Sign Up? 1. In your internet browser, go to [email or web address]. 2. Click on the First Time User? Click Here link in the Sign In box. You will see the New Member Sign Up page. 3. Enter your eEvent Access Code exactly as it appears below. You will not need to use this code after youve completed the sign-up process. If you do not sign up before the expiration date, you must request a new code. eEvent Access Code:  Activation code not generated  Current eEvent Status: Active (This is the date your Boombotix access code will )    4. Enter the last four digits of your Social Security Number (xxxx) and Date of Birth (mm/dd/yyyy) as indicated and click Submit. You will be taken to the next sign-up page. 5. Create a Boombotix ID. This will be your Boombotix login ID and cannot be changed, so think of one that is secure and easy to remember. 6. Create a Boombotix password. You can change your password at any time. 7. Enter your Password Reset Question and Answer. This can be used at a later time if you forget your password. 8. Enter your e-mail address. You will receive e-mail notification when new information is available in [address] 9. Click Sign Up. You can now view and download portions of your medical record. 10. Click the Download Summary menu link to download a portable copy of your medical information. Additional Information    If you have questions, please call [phone]. Remember, Boombotix is NOT to be used for urgent needs. For medical emergencies, dial 911.

## 2017-09-25 NOTE — PROGRESS NOTES
217 [facility]., Steve. [place], [address]  Tel.  [phone]  Fax. 100 [facility] 60  [place], [address] [facility]  Tel.  [phone]  Fax. [phone] [facility] [place] [address] Lisa Hendrickson   Tel.  [phone]  Fax. [phone]         Subjective:      Aury Locke is an 49 y.o. female referred for evaluation for a sleep disorder. She complains of snoring, snorting associated with excessive daytime sleepiness. Symptoms began several years ago, unchanged since that time. She usually can fall asleep in 30 minutes. Family or house members note snoring. She denies falling asleep while driving. Aury Locke does not wake up frequently at night. She is not bothered by waking up too early and left unable to get back to sleep. She actually sleeps about 6 hours at night and wakes up about 3 times during the night. She does work shifts:  First Shift. Odessa Mueller indicates she does get too little sleep at night. Her bedtime is 2300. She awakens at 0530. She does take naps. She takes 1 naps a week lasting 2 hours. She has the following observed behaviors:  ;  .  Other remarks:    She has started to exercise for an hour 3 times a week. She is trying to lose weight. [place] Sleepiness Score: 10   which reflect mild daytime drowsiness. No Known Allergies      Current Outpatient Prescriptions:     raNITIdine (ZANTAC) 150 mg tablet, Take 150 mg by mouth two (2) times a day., Disp: , Rfl:     metFORMIN ER (GLUCOPHAGE XR) 500 mg tablet, TAKE 2 TABS BY MOUTH DAILY (WITH DINNER). , Disp: 60 Tab, Rfl: 2    cetirizine (ZYRTEC) 10 mg tablet, Take 1 Tab by mouth daily. , Disp: 30 Tab, Rfl: 1    fluticasone (FLONASE) 50 mcg/actuation nasal spray, 2 Sprays by Both Nostrils route daily. , Disp: 1 Bottle, Rfl: 2    MULTIVITAMIN PO, Take 2 Tabs by mouth daily. , Disp: , Rfl:      She  has a past medical history of GERD (gastroesophageal reflux disease) (11/24/2014);  Obesity (11/24/2014); and PCOS (polycystic ovarian syndrome) (11/24/2014). She  has no past surgical history on file. She family history includes Cancer in her mother; Hypertension in her father and mother. She  reports that she has never smoked. She has never used smokeless tobacco. She reports that she does not drink alcohol or use illicit drugs. Review of Systems:  Constitutional:  No significant weight loss or weight gain. Eyes:  No blurred vision. CVS:  No significant chest pain  Pulm:  No significant shortness of breath  GI:  No significant nausea or vomiting  :  No significant nocturia  Musculoskeletal:  No significant joint pain at night  Skin:  No significant rashes  Neuro:  No significant dizziness   Psych:  No active mood issues    Sleep Review of Systems: notable for no difficulty falling asleep; +frequent awakenings at night;  regular dreaming noted; no nightmares ; +occasional  early morning headaches; no memory problems; no concentration issues; no history of any automobile or occupational accidents due to daytime drowsiness. Objective:     Visit Vitals    /77    Pulse 66    Ht 5' 3.39\" (1.61 m)    Wt 267 lb 6.4 oz (121.3 kg)    SpO2 99%    BMI 46.79 kg/m2         General:   Not in acute distress   Eyes:  Anicteric sclerae, no obvious strabismus   Nose:  No obvious nasal septum deviation    Oropharynx:   Class 3 oropharyngeal outlet, thick tongue base, enlarged and boggy uvula, low-lying soft palate, narrow tonsilo-pharyngeal pilars   Tonsils:   tonsils are present and normal   Neck:   Neck circ. in \"inches\": 16.5; midline trachea   Chest/Lungs:  Equal lung expansion, clear on auscultation    CVS:  Normal rate, regular rhythm; no JVD   Skin:  Warm to touch; no obvious rashes   Neuro:  No focal deficits ; no obvious tremor    Psych:  Normal affect,  normal countenance;          Assessment:       ICD-10-CM ICD-9-CM    1.  Obstructive sleep apnea (adult) (pediatric) G47.33 327.23 SLEEP STUDY UNATTENDED, 4 CHANNEL   2. Morbid obesity with BMI of 45.0-49.9, adult ([facility]) E66.01 278.01     Z68.42 V85.42          Plan:     * The patient currently has a Moderate Risk for having sleep apnea. STOP-BANG score 5.  * PSG was ordered for initial evaluation. I have reviewed the different types of sleep studies. Attended sleep studies and home sleep apnea tests. Home sleep testing tests only for the presence and severity of sleep apnea. she understands that if the HSAT does not provide reliable result(such as poor data/failed HSAT recording), she may have to repeat the HSAT or come in for an attended polysomnogram.    * She was provided information on sleep apnea including coresponding risk factors and the importance of proper treatment. * Counseling was provided regarding proper sleep hygiene and safe driving. *Treatment options for sleep apnea were reviewed. she is not against a trial of PAP if found to have significant sleep apnea. The treatment plan was reviewed with the patient in detail and reviewed with the patient and the lead technologist. she understands that the lead technologist will be calling her  with the results and assisting with the next step in the treatment plan as outlined today during the consultation with me. All of her questions were addressed. 2. .Obesity - I have counseled the patient regarding the benefits of weight loss. Thank you for allowing us to participate in your patient's medical care. We'll keep you updated on these investigations.     Awilda Gonzales MD  Diplomate in Sleep Medicine  [facility]

## 2017-09-27 ENCOUNTER — TELEPHONE (OUTPATIENT)
Dept: SLEEP MEDICINE | Age: 47
End: 2017-09-27

## 2017-09-27 DIAGNOSIS — G47.33 OBSTRUCTIVE SLEEP APNEA (ADULT) (PEDIATRIC): Primary | ICD-10-CM

## 2017-09-27 NOTE — TELEPHONE ENCOUNTER
HSAT Returned - 1 [facility]    Date of Study: 9/25/17    The following information was gathered from the patients study log:    · Lights off: 9:15P  · Estimated sleep onset: 10:15P    · Awakened a total of 5 times  · The patient felt they slept 5 hours  · Patient took no sleep aid before starting the test  · Sleep quality was worse compared to a usual nights sleep.     Further information provided: -

## 2017-09-29 ENCOUNTER — TELEPHONE (OUTPATIENT)
Dept: SLEEP MEDICINE | Age: 47
End: 2017-09-29

## 2017-09-29 ENCOUNTER — DOCUMENTATION ONLY (OUTPATIENT)
Dept: SLEEP MEDICINE | Age: 47
End: 2017-09-29

## 2017-09-29 ENCOUNTER — OFFICE VISIT (OUTPATIENT)
Dept: INTERNAL MEDICINE CLINIC | Age: 47
End: 2017-09-29

## 2017-09-29 VITALS
TEMPERATURE: 98.3 F | HEIGHT: 64 IN | RESPIRATION RATE: 18 BRPM | HEART RATE: 77 BPM | WEIGHT: 268 LBS | DIASTOLIC BLOOD PRESSURE: 82 MMHG | BODY MASS INDEX: 45.75 KG/M2 | OXYGEN SATURATION: 97 % | SYSTOLIC BLOOD PRESSURE: 116 MMHG

## 2017-09-29 DIAGNOSIS — E66.01 OBESITY, MORBID, BMI 40.0-49.9 (HCC): ICD-10-CM

## 2017-09-29 DIAGNOSIS — G47.33 OSA (OBSTRUCTIVE SLEEP APNEA): ICD-10-CM

## 2017-09-29 DIAGNOSIS — E28.2 PCOS (POLYCYSTIC OVARIAN SYNDROME): Primary | ICD-10-CM

## 2017-09-29 DIAGNOSIS — K21.9 GASTROESOPHAGEAL REFLUX DISEASE, ESOPHAGITIS PRESENCE NOT SPECIFIED: ICD-10-CM

## 2017-09-29 DIAGNOSIS — Z23 ENCOUNTER FOR IMMUNIZATION: ICD-10-CM

## 2017-09-29 RX ORDER — METFORMIN HYDROCHLORIDE 500 MG/1
TABLET, EXTENDED RELEASE ORAL
Qty: 180 TAB | Refills: 1 | Status: SHIPPED | OUTPATIENT
Start: 2017-09-29 | End: 2018-05-18 | Stop reason: SDUPTHER

## 2017-09-29 NOTE — PROGRESS NOTES
This note is being routed to Dr. Maida Jerez. Sleep Medicine consult note and sleep study report in patient's chart for review.     Thank you for the referral.

## 2017-09-29 NOTE — TELEPHONE ENCOUNTER
HSAT results in R-drive. Test positive for mild sleep apnea. AHI 6/hour and lowest oxygen saturation was 88%. We had discussed treatment options at initial consultation. Based on the results of the home sleep apnea test, I believe a trial of APAP would be an effective mode of therapy. APAP order attached. she should be seen in the sleep disorder center 4-6 weeks after initiating PAP therapy. The APAP will have modem access so she can call the sleep center if she  has questions/concerns regarding the initial PAP settings. Front staff to Order PAP and call patient and let them know which DME company they should be hearing from after results reviewed with lead support technologist.   Schedule for first adherence visit in 6 weeks.    *Remote download in 2 weeks

## 2017-09-29 NOTE — PATIENT INSTRUCTIONS
It was a pleasure to see you! As discussed:    Congratulations on your positive lifestyle changes!!! Three Goals (Big Goal: at least  3-5 lbs weight loss by next visit)  1. Continue your weight training program  2. If you have not lost 3 inches or 3lbs by next appt---> Diet program  3. Review whole 30 diet to see foods that may be contributing to weight gain. Recommended \"Diets\"  Choose a healthy eating plan that works best for you. Some ideas are:  Whole 30 Diet: http://whole30.com/  Mediterranean Diet: ResidentialBook.de  Low Carb Diet: Lodestone Social Media.nl  Fast Metabolism Diet: http://M[employer]. EASE Technologies/books/the-fast-metabolism-diet/           Starting a Weight Loss Plan: Care Instructions  Your Care Instructions  If you are thinking about losing weight, it can be hard to know where to start. Your doctor can help you set up a weight loss plan that best meets your needs. You may want to take a class on nutrition or exercise, or join a weight loss support group. If you have questions about how to make changes to your eating or exercise habits, ask your doctor about seeing a registered dietitian or an exercise specialist.  It can be a big challenge to lose weight. But you do not have to make huge changes at once. Make small changes, and stick with them. When those changes become habit, add a few more changes. If you do not think you are ready to make changes right now, try to pick a date in the future. Make an appointment to see your doctor to discuss whether the time is right for you to start a plan. Follow-up care is a key part of your treatment and safety. Be sure to make and go to all appointments, and call your doctor if you are having problems. Its also a good idea to know your test results and keep a list of the medicines you take.   How can you care for yourself at home? · Set realistic goals. Many people expect to lose much more weight than is likely. A weight loss of 5% to 10% of your body weight may be enough to improve your health. · Get family and friends involved to provide support. Talk to them about why you are trying to lose weight, and ask them to help. They can help by participating in exercise and having meals with you, even if they may be eating something different. · Find what works best for you. If you do not have time or do not like to cook, a program that offers meal replacement bars or shakes may be better for you. Or if you like to prepare meals, finding a plan that includes daily menus and recipes may be best.  · Ask your doctor about other health professionals who can help you achieve your weight loss goals. ¨ A dietitian can help you make healthy changes in your diet. ¨ An exercise specialist or  can help you develop a safe and effective exercise program.  ¨ A counselor or psychiatrist can help you cope with issues such as depression, anxiety, or family problems that can make it hard to focus on weight loss. · Consider joining a support group for people who are trying to lose weight. Your doctor can suggest groups in your area. Where can you learn more? Go to http://henrietta-dwight.info/. Enter J233 in the search box to learn more about \"Starting a Weight Loss Plan: Care Instructions. \"  Current as of: October 13, 2016  Content Version: 11.3  © 4024-5211 [employer]. Care instructions adapted under license by [employer] (which disclaims liability or warranty for this information). If you have questions about a medical condition or this instruction, always ask your healthcare professional. [place] [address] any warranty or liability for your use of this information.

## 2017-09-29 NOTE — MR AVS SNAPSHOT
Visit Information Date & Time Provider Department Dept. Phone Encounter #  
 9/29/2017  3:00 PM Brian Monahan MD [facility] Internal Medicine [phone] [id number] Upcoming Health Maintenance Date Due INFLUENZA AGE 9 TO ADULT 8/1/2017 PAP AKA CERVICAL CYTOLOGY 2/11/2018 BREAST CANCER SCRN MAMMOGRAM 3/13/2018 DTaP/Tdap/Td series (2 - Td) 4/20/2026 Allergies as of 9/29/2017  Review Complete On: 9/29/2017 By: Brian Monahan MD  
 No Known Allergies Current Immunizations  Reviewed on 8/5/2015 Name Date Influenza Vaccine 10/15/2014 Tdap 4/20/2016 Not reviewed this visit You Were Diagnosed With   
  
 Codes Comments Obesity, morbid, BMI 40.0-49.9 (HCC)    -  Primary ICD-10-CM: E66.01 
ICD-9-CM: 278.01   
 PCOS (polycystic ovarian syndrome)     ICD-10-CM: E28.2 ICD-9-CM: 256.4 Gastroesophageal reflux disease, esophagitis presence not specified     ICD-10-CM: K21.9 ICD-9-CM: 530.81   
 JESSY (obstructive sleep apnea)     ICD-10-CM: G47.33 
ICD-9-CM: 327.23 Vitals BP Pulse Temp Resp Height(growth percentile) Weight(growth percentile) 116/82 (BP 1 Location: Left arm, BP Patient Position: Sitting) 77 98.3 °F (36.8 °C) (Oral) 18 5' 3.9\" (1.623 m) 268 lb (121.6 kg) LMP SpO2 BMI OB Status Smoking Status 09/19/2017 97% 46.15 kg/m2 Having regular periods Never Smoker Vitals History BMI and BSA Data Body Mass Index Body Surface Area  
 46.15 kg/m 2 2.34 m 2 Preferred Pharmacy Pharmacy Name Phone [id number] [address] [phone] Your Updated Medication List  
  
   
This list is accurate as of: 9/29/17  3:49 PM.  Always use your most recent med list.  
  
  
  
  
 cetirizine 10 mg tablet Commonly known as:  ZYRTEC Take 1 Tab by mouth daily. fluticasone 50 mcg/actuation nasal spray Commonly known as:  Cyndra Puna 2 Sprays by Both Nostrils route daily. metFORMIN  mg tablet Commonly known as:  GLUCOPHAGE XR  
TAKE 2 TABS BY MOUTH DAILY (WITH DINNER). MULTIVITAMIN PO Take 2 Tabs by mouth daily. ZANTAC 150 mg tablet Generic drug:  raNITIdine Take 150 mg by mouth two (2) times a day. Prescriptions Sent to Pharmacy Refills  
 metFORMIN ER (GLUCOPHAGE XR) 500 mg tablet 1 Sig: TAKE 2 TABS BY MOUTH DAILY (WITH DINNER). Class: Normal  
 Pharmacy: [address] [place] [address] #: [phone] Patient Instructions It was a pleasure to see you! As discussed: 
 
Congratulations on your positive lifestyle changes!!! Three Goals (Big Goal: at least  3-5 lbs weight loss by next visit) 1. Continue your weight training program 
2. If you have not lost 3 inches or 3lbs by next appt---> Diet program 
3. Review whole 30 diet to see foods that may be contributing to weight gain. Recommended \"Diets\" Choose a healthy eating plan that works best for you. Some ideas are: 
Whole 30 Diet: http://whole30.com/ 
Mediterranean Diet: ResidentialBook.de Low Carb Diet: LifeHead.nl Fast Metabolism Diet: http://VM6 [employer]. [employer]/books/the-fast-metabolism-diet/ 
 
 
 
  
Starting a Weight Loss Plan: Care Instructions Your Care Instructions If you are thinking about losing weight, it can be hard to know where to start. Your doctor can help you set up a weight loss plan that best meets your needs. You may want to take a class on nutrition or exercise, or join a weight loss support group.  If you have questions about how to make changes to your eating or exercise habits, ask your doctor about seeing a registered dietitian or an exercise specialist. 
 It can be a big challenge to lose weight. But you do not have to make huge changes at once. Make small changes, and stick with them. When those changes become habit, add a few more changes. If you do not think you are ready to make changes right now, try to pick a date in the future. Make an appointment to see your doctor to discuss whether the time is right for you to start a plan. Follow-up care is a key part of your treatment and safety. Be sure to make and go to all appointments, and call your doctor if you are having problems. Its also a good idea to know your test results and keep a list of the medicines you take. How can you care for yourself at home? · Set realistic goals. Many people expect to lose much more weight than is likely. A weight loss of 5% to 10% of your body weight may be enough to improve your health. · Get family and friends involved to provide support. Talk to them about why you are trying to lose weight, and ask them to help. They can help by participating in exercise and having meals with you, even if they may be eating something different. · Find what works best for you. If you do not have time or do not like to cook, a program that offers meal replacement bars or shakes may be better for you. Or if you like to prepare meals, finding a plan that includes daily menus and recipes may be best. 
· Ask your doctor about other health professionals who can help you achieve your weight loss goals. ¨ A dietitian can help you make healthy changes in your diet. ¨ An exercise specialist or  can help you develop a safe and effective exercise program. 
¨ A counselor or psychiatrist can help you cope with issues such as depression, anxiety, or family problems that can make it hard to focus on weight loss. · Consider joining a support group for people who are trying to lose weight. Your doctor can suggest groups in your area. Where can you learn more? Go to http://henrietta-dwight.info/. Enter S861 in the search box to learn more about \"Starting a Weight Loss Plan: Care Instructions. \" Current as of: October 13, 2016 Content Version: 11.3 © 3184-5785 Ecopol. Care instructions adapted under license by [employer] (which disclaims liability or warranty for this information). If you have questions about a medical condition or this instruction, always ask your healthcare professional. [address] any warranty or liability for your use of this information. Introducing \[facility]\"" & HEALTH SERVICES! Dear Olya Valles: Thank you for requesting a Affin[employer] account. Our records indicate that you already have an active Affinergy account. You can access your account anytime at [email or web address] Did you know that you can access your hospital and ER discharge instructions at any time in Affinergy? You can also review all of your test results from your hospital stay or ER visit. Additional Information If you have questions, please visit the Frequently Asked Questions section of the Affinergy website at [email or web address] Remember, Affinergy is NOT to be used for urgent needs. For medical emergencies, dial 911. Now available from your iPhone and Android! Please provide this summary of care documentation to your next provider. Your primary care clinician is listed as Umm Cruz. If you have any questions after today's visit, please call [phone].

## 2017-09-29 NOTE — TELEPHONE ENCOUNTER
Return call was received from patient. Two-factor patient identification noted. Dr. Con Nina findings and action plan were reviewed with patient. Patient has a understanding and is willing to proceed with a trial of Auto PAP therapy as discussed at her initial consultation. It was shared with the patient that the study results are viewable in her chart and we would also send a copy to Aldair Romo MD as well .

## 2017-09-29 NOTE — PROGRESS NOTES
HISTORY OF PRESENT ILLNESS  Rosaleeyoana Cobb is a 49 y.o. female. HPI   Cardiovascular Review:  The patient has obesity Body mass index is 46.15 kg/(m^2). gained 5lbs and PCOS. Completed Sleep Study-reviewed positive for sleep apnea   Diet and Lifestyle: generally follows a low fat low cholesterol diet, exercises regularly, started weight building program at gym. Completed Sleep Study   Stress management improved via exercise and behavioral changes   Home BP Monitoring: is not measured at home. Pertinent ROS: taking medications as instructed, no medication side effects noted, no TIA's, no chest pain on exertion, no dyspnea on exertion, no swelling of ankles. Accomplished   1. End Ketogenic Diet  2. Schedule appointment with Weight Management Program    Three Goals (Big Goal: at least  3-5 lbs weight loss by next visit)- None completed   3. Optimize Stress: Return to EAP to discuss coping strategies to discuss how to deal with your supervisor. In the interim reflect on why your supervisor bothers you enough to affect your desire to stay at a job you love. I have reviewed/discussed the above normal BMI with the patient. I have recommended the following interventions: dietary management education, guidance, and counseling . .       Review of Systems   Constitutional: Negative for diaphoresis, fever and weight loss. Eyes: Negative for blurred vision and pain. Respiratory: Negative for shortness of breath. Cardiovascular: Negative for chest pain, orthopnea and leg swelling. Genitourinary: Negative for dysuria. Neurological: Negative for focal weakness and headaches. Psychiatric/Behavioral: Negative for depression.      Patient Active Problem List    Diagnosis Date Noted    Obesity 11/24/2014    GERD (gastroesophageal reflux disease) 11/24/2014    PCOS (polycystic ovarian syndrome) 11/24/2014       Current Outpatient Prescriptions   Medication Sig Dispense Refill    raNITIdine (ZANTAC) 150 mg tablet Take 150 mg by mouth two (2) times a day.  metFORMIN ER (GLUCOPHAGE XR) 500 mg tablet TAKE 2 TABS BY MOUTH DAILY (WITH DINNER). 60 Tab 2    MULTIVITAMIN PO Take 2 Tabs by mouth daily.  cetirizine (ZYRTEC) 10 mg tablet Take 1 Tab by mouth daily. 30 Tab 1    fluticasone (FLONASE) 50 mcg/actuation nasal spray 2 Sprays by Both Nostrils route daily. 1 Bottle 2       No Known Allergies   Visit Vitals    /82 (BP 1 Location: Left arm, BP Patient Position: Sitting)    Pulse 77    Temp 98.3 °F (36.8 °C) (Oral)    Resp 18    Ht 5' 3.9\" (1.623 m)    Wt 268 lb (121.6 kg)    SpO2 97%    BMI 46.15 kg/m2       Physical Exam   Constitutional: She is oriented to person, place, and time. She appears well-developed. No distress. Eyes: Conjunctivae are normal.   Neck: Neck supple. No thyromegaly present. Cardiovascular: Normal rate, regular rhythm and normal heart sounds. Pulmonary/Chest: Effort normal and breath sounds normal. No respiratory distress. She has no wheezes. She has no rales. She exhibits no tenderness. Musculoskeletal: She exhibits no edema (Ble ). Lymphadenopathy:     She has no cervical adenopathy. Neurological: She is alert and oriented to person, place, and time. Skin: Skin is warm. Psychiatric: She has a normal mood and affect. ASSESSMENT and PLAN  Diagnoses and all orders for this visit:    1. PCOS (polycystic ovarian syndrome)- working on weight management as discussed in AVS.   -     metFORMIN ER (GLUCOPHAGE XR) 500 mg tablet; TAKE 2 TABS BY MOUTH DAILY (WITH DINNER). 2. Obesity, morbid, BMI 40.0-49.9 ([address]- I have reviewed/discussed the above normal BMI with the patient. I have recommended the following interventions: dietary management education, guidance, and counseling . Sarah Jonese Three Goals (Big Goal: at least  3-5 lbs weight loss by next visit)  1. Continue your weight training program  2. If you have not lost 3 inches or 3lbs by next appt---> Diet program  3. Review whole 30 diet to see foods that may be contributing to weight gain. 3. Gastroesophageal reflux disease, esophagitis presence not specified- stable. 4. JESSY (obstructive sleep apnea)- management per sleep medicine    5. Encounter for immunization- received in office without complication.   -     INFLUENZA VIRUS VACCINE QUADRIVALENT, PRESERVATIVE FREE SYRINGE (74587)  -     VA IMMUNIZ ADMIN,1 SINGLE/COMB VAC/TOXOID      Follow-up Disposition: 3 months- weight management     Medication risks/benefits/costs/interactions/alternatives discussed with patient. Ronny Fabry  was given an after visit summary which includes diagnoses, current medications, & vitals. she expressed understanding with the diagnosis and plan.

## 2017-09-29 NOTE — PROGRESS NOTES
Weight circumference 58 1/2    Patient received flu vaccine in office today. Administered Fluarix 0.5 ml in left deltoid, IM.  Pt tolerated well

## 2017-09-29 NOTE — TELEPHONE ENCOUNTER
Patient called stating that she has a doctors appointment at 3pm today. She would like her doctor to have her sleep study results in before her appointment. She is waiting on a results call. Please advise.

## 2017-11-08 ENCOUNTER — TELEPHONE (OUTPATIENT)
Dept: SLEEP MEDICINE | Age: 47
End: 2017-11-08

## 2017-11-08 ENCOUNTER — OFFICE VISIT (OUTPATIENT)
Dept: INTERNAL MEDICINE CLINIC | Age: 47
End: 2017-11-08

## 2017-11-08 VITALS
RESPIRATION RATE: 16 BRPM | DIASTOLIC BLOOD PRESSURE: 70 MMHG | OXYGEN SATURATION: 98 % | SYSTOLIC BLOOD PRESSURE: 112 MMHG | HEART RATE: 68 BPM | WEIGHT: 265 LBS | BODY MASS INDEX: 45.24 KG/M2 | TEMPERATURE: 97.7 F | HEIGHT: 64 IN

## 2017-11-08 DIAGNOSIS — G47.33 OSA (OBSTRUCTIVE SLEEP APNEA): ICD-10-CM

## 2017-11-08 DIAGNOSIS — R51.9 SINUS HEADACHE: Primary | ICD-10-CM

## 2017-11-08 DIAGNOSIS — J30.89 CHRONIC NONSEASONAL ALLERGIC RHINITIS DUE TO OTHER ALLERGEN: ICD-10-CM

## 2017-11-08 NOTE — PROGRESS NOTES
Chief Complaint   Patient presents with    Headache     1. Have you been to the ER, urgent care clinic since your last visit? Hospitalized since your last visit? No    2. Have you seen or consulted any other health care providers outside of the [address] [place] [address] since your last visit? Include any pap smears or colon screening.  No      Headache for past 2 days, frontal. Not able to sleep

## 2017-11-08 NOTE — PATIENT INSTRUCTIONS
It was a pleasure to see you! As discussed:  [address] [facility] 3 indulgence days then stick to your healthy eating plan in between  Taste and share your treats in between   Weigh your at least once a week (ideally should be done daily)     Headache   -Your symptoms are likely due to a sinus headache from allergies (Allergic rhinitis). -Use Flonase 1-2 sprays per nostril once a day  -Use nasal saline spray 3-4 times/day BEFORE Flonase  -Start taking a non sedating antihistamine such as Claritin, Allegra or Zyrtec (generic is fine)  -You can use Suda-fed for 48-72hrs. Call 911 anytime you think you may need emergency care. For example, call if:  · You have signs of a stroke. These may include:  ¨ Sudden numbness, paralysis, or weakness in your face, arm, or leg, especially on only one side of your body. ¨ Sudden vision changes. ¨ Sudden trouble speaking. ¨ Sudden confusion or trouble understanding simple statements. ¨ Sudden problems with walking or balance. ¨ A sudden, severe headache that is different from past headaches. Call your doctor now or seek immediate medical care if:  · You have a new or worse headache. · Your headache gets much worse. See [facility] for more details         Head or Face Pain: Care Instructions  Your Care Instructions    Common causes of head or face pain are allergies, stress, and injuries. Other causes include tooth problems and sinus infections. Eating certain foods, such as chocolate or cheese, or drinking certain liquids, such as coffee or cola, can cause head pain for some people. If you have mild head pain, you may not need treatment. It is important to watch your symptoms and talk to your doctor if your pain continues or gets worse. Follow-up care is a key part of your treatment and safety. Be sure to make and go to all appointments, and call your doctor if you are having problems.  It's also a good idea to know your test results and keep a list of the medicines you take. How can you care for yourself at home? · Take pain medicines exactly as directed. ¨ If the doctor gave you a prescription medicine for pain, take it as prescribed. ¨ If you are not taking a prescription pain medicine, ask your doctor if you can take an over-the-counter pain medicine. · Take it easy for the next few days or longer if you are not feeling well. · Use a warm, moist towel or heating pad set on low to relax tight muscles in your shoulder and neck. Have someone gently massage your neck and shoulders. · Put ice or a cold pack on the area for 10 to 20 minutes at a time. Put a thin cloth between the ice and your skin. When should you call for help? Call 911 anytime you think you may need emergency care. For example, call if:  ? · You have twitching, jerking, or a seizure. ? · You passed out (lost consciousness). ? · You have symptoms of a stroke. These may include:  ¨ Sudden numbness, tingling, weakness, or loss of movement in your face, arm, or leg, especially on only one side of your body. ¨ Sudden vision changes. ¨ Sudden trouble speaking. ¨ Sudden confusion or trouble understanding simple statements. ¨ Sudden problems with walking or balance. ¨ A sudden, severe headache that is different from past headaches. ? · You have jaw pain and pain in your chest, shoulder, neck, or arm. ?Call your doctor now or seek immediate medical care if:  ? · You have a fever with a stiff neck or a severe headache. ? · You have nausea and vomiting, or you cannot keep food or liquids down. ? Watch closely for changes in your health, and be sure to contact your doctor if:  ? · Your head or face pain does not get better as expected. Where can you learn more? Go to http://henrietta-dwight.info/. Enter P568 in the search box to learn more about \"Head or Face Pain: Care Instructions. \"  Current as of: March 20, 2017  Content Version: 11.4  © 5042-4541 [employer]. Care instructions adapted under license by [employer] (which disclaims liability or warranty for this information). If you have questions about a medical condition or this instruction, always ask your healthcare professional. [address] any warranty or liability for your use of this information.

## 2017-11-08 NOTE — PROGRESS NOTES
HISTORY OF PRESENT ILLNESS  Augusto Underwood is a 49 y.o. female. Head Pain    This is a recurrent problem. The current episode started more than 1 week ago. Episode frequency: more frequently  The problem has been gradually worsening. The pain is located in the bilateral and frontal region. The quality of the pain is described as dull. The pain is at a severity of 5/10. The pain is mild. Associated symptoms include malaise/fatigue and visual change. Pertinent negatives include no chest pressure, no shortness of breath, no dizziness and no nausea. JESSY   She has completed her home sleep study. She reports she has not been  contacted by sleep medicine to schedule follow-up appointment. She is concerned that her JESSY may be causing her current headache. Records reviewed and positive sleep test noted. Review of Systems   Constitutional: Positive for malaise/fatigue. HENT: Positive for congestion. Respiratory: Negative for shortness of breath. Cardiovascular: Negative for chest pain. Gastrointestinal: Negative for nausea. Neurological: Negative for dizziness. Patient Active Problem List    Diagnosis Date Noted    JESSY (obstructive sleep apnea) 09/29/2017    Obesity 11/24/2014    GERD (gastroesophageal reflux disease) 11/24/2014    PCOS (polycystic ovarian syndrome) 11/24/2014       Current Outpatient Prescriptions   Medication Sig Dispense Refill    metFORMIN ER (GLUCOPHAGE XR) 500 mg tablet TAKE 2 TABS BY MOUTH DAILY (WITH DINNER). 180 Tab 1    MULTIVITAMIN PO Take 2 Tabs by mouth daily.  raNITIdine (ZANTAC) 150 mg tablet Take 150 mg by mouth two (2) times a day.  cetirizine (ZYRTEC) 10 mg tablet Take 1 Tab by mouth daily. 30 Tab 1    fluticasone (FLONASE) 50 mcg/actuation nasal spray 2 Sprays by Both Nostrils route daily.  1 Bottle 2       No Known Allergies   Visit Vitals    /70 (BP 1 Location: Right arm, BP Patient Position: Sitting)    Pulse 68    Temp 97.7 °F (36.5 °C) (Oral)    Resp 16    Ht 5' 3.9\" (1.623 m)    Wt 265 lb (120.2 kg)    SpO2 98%    BMI 45.63 kg/m2         Physical Exam   Constitutional: She is oriented to person, place, and time. She appears well-developed. No distress. HENT:   Right Ear: Tympanic membrane is retracted. Left Ear: Tympanic membrane is retracted. Nose: Mucosal edema and rhinorrhea present. No septal deviation. Right sinus exhibits no maxillary sinus tenderness and no frontal sinus tenderness. Left sinus exhibits frontal sinus tenderness. Left sinus exhibits no maxillary sinus tenderness. Mouth/Throat: No oropharyngeal exudate, posterior oropharyngeal edema or posterior oropharyngeal erythema. Eyes: Conjunctivae are normal.   Neck: Neck supple. Cardiovascular: Normal rate, regular rhythm and normal heart sounds. Pulmonary/Chest: Effort normal and breath sounds normal. No respiratory distress. She has no wheezes. She has no rales. She exhibits no tenderness. Lymphadenopathy:     She has no cervical adenopathy. Neurological: She is alert and oriented to person, place, and time. Psychiatric: She has a normal mood and affect. ASSESSMENT and PLAN  Diagnoses and all orders for this visit:    1. Sinus headache- resume allergy regimen. A  -Use Flonase 1-2 sprays per nostril once a day  -Use nasal saline spray 3-4 times/day BEFORE Flonase  -Start taking a non sedating antihistamine such as Claritin, Allegra or Zyrtec (generic is fine)  -You can use Suda-fed for 48-72hrs. Red flags to warrant ER or earlier clinical evaluation reviewed. See AVS for full details of plan and patient discussion. 2. Chronic nonseasonal allergic rhinitis due to other allergen- see above. 3. JESSY (obstructive sleep apnea)- message sent to Dr. Jamal Vera to set up an appointment. Follow-up Disposition: If symptoms worsen or fail to improve    Medication risks/benefits/costs/interactions/alternatives discussed with patient.   Semaj Leigh Sana Sanford  was given an after visit summary which includes diagnoses, current medications, & vitals. she expressed understanding with the diagnosis and plan.

## 2017-11-08 NOTE — TELEPHONE ENCOUNTER
PAP order written on 9-29-17  I received message from PMD that patient has not been contacted regarding her PAP setup. Contact dme and request set up ASAP.

## 2017-11-09 NOTE — TELEPHONE ENCOUNTER
Sent order to THE [facility] as STAT order. Notified patient of DME and scheduled 1st adherence.

## 2017-11-22 ENCOUNTER — TELEPHONE (OUTPATIENT)
Dept: SLEEP MEDICINE | Age: 47
End: 2017-11-22

## 2017-11-22 NOTE — TELEPHONE ENCOUNTER
Patient was scheduled for stat cpap setup by THE [facility]- during this call, patients are made aware of cost to them. Patient showed up for pap set up today and refused to pay her $100+ copay stating her insurance is too good to have to pay a copay for her setup. She was not set up by THE [facility] today. Mickey Julio called and notified us of this.

## 2018-01-10 ENCOUNTER — OFFICE VISIT (OUTPATIENT)
Dept: SLEEP MEDICINE | Age: 48
End: 2018-01-10

## 2018-01-10 VITALS
BODY MASS INDEX: 46.44 KG/M2 | OXYGEN SATURATION: 97 % | HEART RATE: 81 BPM | DIASTOLIC BLOOD PRESSURE: 80 MMHG | SYSTOLIC BLOOD PRESSURE: 124 MMHG | WEIGHT: 272 LBS | HEIGHT: 64 IN

## 2018-01-10 DIAGNOSIS — E66.01 MORBID OBESITY WITH BMI OF 45.0-49.9, ADULT (HCC): ICD-10-CM

## 2018-01-10 DIAGNOSIS — G47.33 OSA (OBSTRUCTIVE SLEEP APNEA): Primary | ICD-10-CM

## 2018-01-10 NOTE — PATIENT INSTRUCTIONS
[address] [place]. [place], [address]  Tel.  [phone]  Fax. 100 [facility] 60  [place], [address] [facility]  Tel.  [phone]  Fax. [phone] [address] [facility] Lisa [place]ricks[place]  Tel.  [phone]  Fax. [phone]     PROPER SLEEP HYGIENE    What to avoid  · Do not have drinks with caffeine, such as coffee or black tea, for 8 hours before bed. · Do not smoke or use other types of tobacco near bedtime. Nicotine is a stimulant and can keep you awake. · Avoid drinking alcohol late in the evening, because it can cause you to wake in the middle of the night. · Do not eat a big meal close to bedtime. If you are hungry, eat a light snack. · Do not drink a lot of water close to bedtime, because the need to urinate may wake you up during the night. · Do not read or watch TV in bed. Use the bed only for sleeping and sexual activity. What to try  · Go to bed at the same time every night, and wake up at the same time every morning. Do not take naps during the day. · Keep your bedroom quiet, dark, and cool. · Get regular exercise, but not within 3 to 4 hours of your bedtime. .  · Sleep on a comfortable pillow and mattress. · If watching the clock makes you anxious, turn it facing away from you so you cannot see the time. · If you worry when you lie down, start a worry book. Well before bedtime, write down your worries, and then set the book and your concerns aside. · Try meditation or other relaxation techniques before you go to bed. · If you cannot fall asleep, get up and go to another room until you feel sleepy. Do something relaxing. Repeat your bedtime routine before you go to bed again. · Make your house quiet and calm about an hour before bedtime. Turn down the lights, turn off the TV, log off the computer, and turn down the volume on music. This can help you relax after a busy day.     Drowsy Driving  The [address] [place] [address] Road Traffic Safety Administration cites drowsiness as a causing factor in more than 037,096 police reported crashes annually, resulting in 76,000 injuries and 1,500 deaths. Other surveys suggest 55% of people polled have driven while drowsy in the past year, 23% had fallen asleep but not crashed, 3% crashed, and 2% had and accident due to drowsy driving. Who is at risk? Young Drivers: One study of drowsy driving accidents states that 55% of the drivers were under 25 years. Of those, 75% were male. Shift Workers and Travelers: People who work overnight or travel across time zones frequently are at higher risk of experiencing Circadian Rhythm Disorders. They are trying to work and function when their body is programed to sleep. Sleep Deprived: Lack of sleep has a serious impact on your ability to pay attention or focus on a task. Consistently getting less than the average of 8 hours your body needs creates partial or cumulative sleep deprivation. Untreated Sleep Disorders: Sleep Apnea, Narcolepsy, R.L.S., and other sleep disorders (untreated) prevent a person from getting enough restful sleep. This leads to excessive daytime sleepiness and increases the risk for drowsy driving accidents by up to 7 times. Medications / Alcohol: Even over the counter medications can cause drowsiness. Medications that impair a drivers attention should have a warning label. Alcohol naturally makes you sleepy and on its own can cause accidents. Combined with excessive drowsiness its effects are amplified. Signs of Drowsy Driving:   * You don't remember driving the last few miles   * You may drift out of your dagmar   * You are unable to focus and your thoughts wander   * You may yawn more often than normal   * You have difficulty keeping your eyes open / nodding off   * Missing traffic signs, speeding, or tailgating  Prevention-   Good sleep hygiene, lifestyle and behavioral choices have the most impact on drowsy driving.  There is no substitute for sleep and the average person requires 8 hours nightly. If you find yourself driving drowsy, stop and sleep. Consider the sleep hygiene tips provided during your visit as well. Medication Refill Policy: Refills for all medications require 1 week advance notice. Please have your pharmacy fax a refill request. We are unable to fax, or call in \"controled substance\" medications and you will need to pick these prescriptions up from our office. [employer] - Parking Tickets Activation    Thank you for requesting access to [employer]ault. Please follow the instructions below to securely access and download your online medical record. Sh[employer]Vault allows you to send messages to your doctor, view your test results, renew your prescriptions, schedule appointments, and more. How Do I Sign Up? 1. In your internet browser, go to [email or web address]. 2. Click on the First Time User? Click Here link in the Sign In box. You will see the New Member Sign Up page. 3. Enter your Sh[employer]Vault Access Code exactly as it appears below. You will not need to use this code after youve completed the sign-up process. If you do not sign up before the expiration date, you must request a new code. Sh[employer]Vault Access Code: Activation code not generated  Current SharesVault Status: Active (This is the date your SharesVault access code will )    4. Enter the last four digits of your Social Security Number (xxxx) and Date of Birth (mm/dd/yyyy) as indicated and click Submit. You will be taken to the next sign-up page. 5. Create a SharesVault ID. This will be your SharesVault login ID and cannot be changed, so think of one that is secure and easy to remember. 6. Create a Sh[employer]Vault password. You can change your password at any time. 7. Enter your Password Reset Question and Answer. This can be used at a later time if you forget your password. 8. Enter your e-mail address. You will receive e-mail notification when new information is available in [address]   9. Click Sign Up. You can now view and download portions of your medical record. 10. Click the Download Summary menu link to download a portable copy of your medical information. Additional Information    If you have questions, please call [phone]. Remember, [employer] is NOT to be used for urgent needs. For medical emergencies, dial 911.

## 2018-01-10 NOTE — PROGRESS NOTES
Pressure adjustment made on patient's Respironics Dreamstation from 5cm-12cm to 6cm-12cm done remotely in the lab per doctor's request.

## 2018-01-10 NOTE — PROGRESS NOTES
[address] [facility] [place]. [place], [address]  Tel.  [phone]  Fax. 100 [facility] 60  [place], [address] [facility]  Tel.  [phone]  Fax. [phone] [address]   Tel.  [phone]  Fax. [phone]     S>Sandra Herrera is a 49 y.o. female seen for a positive airway pressure follow-up. She reports no problems using the device. The following problems are identified:    Drowsiness no Problems exhaling no   Snoring no Forget to put on yes   Mask Comfortable yes Can't fall asleep no   Dry Mouth no Mask falls off no   Air Leaking no Frequent awakenings no     Download reviewed. She admits that her sleep has improved. Therapy Apnea Index averaged over PAP use: 4/hr which reflects improved sleep breathing condition. No Known Allergies    She has a current medication list which includes the following prescription(s): metformin er, cetirizine, fluticasone, multivitamin, and ranitidine. .      She  has a past medical history of GERD (gastroesophageal reflux disease) (11/24/2014); Obesity (11/24/2014); and PCOS (polycystic ovarian syndrome) (11/24/2014). Tyler Sleepiness Score: 7      O>    Visit Vitals    /80    Pulse 81    Ht 5' 3.9\" (1.623 m)    Wt 272 lb (123.4 kg)    SpO2 97%    BMI 46.83 kg/m2           General:   Alert, oriented, not in distress   Neck:   No JVD    Chest/Lungs:  symetrical lung expansion , no accessory muscle use    Extremities:  no obvious rashes , negative edema    Neuro:  No focal deficits ; No obvious tremor    Psych:  Normal affect ,  Normal countenance ;           A>    ICD-10-CM ICD-9-CM    1. JESSY (obstructive sleep apnea) G47.33 327.23    2. Morbid obesity with BMI of 45.0-49.9, adult ([facility]) E66.01 278.01     Z68.42 V85.42      AHI = 6(9-17). On CPAP :  5-12 cmH2O.     Compliant:      no  , not optimally  Therapeutic Response:  Positive    P>      * Follow-up Disposition:  Return in about 1 year (around 1/10/2019). Increase setting to 6-12 cmH20  * She was asked to contact our office for any problems regarding PAP therapy. * Counseling was provided regarding the importance of regular PAP use and on proper sleep hygiene and safe driving. * Re-enforced proper and regular cleaning for the device. 2. Obesity - I have counseled the patient regarding the benefits of weight loss.       Maura Haro MD  Diplomate in Sleep Medicine  [facility]

## 2018-02-09 DIAGNOSIS — Z00.00 ROUTINE GENERAL MEDICAL EXAMINATION AT A HEALTH CARE FACILITY: Primary | ICD-10-CM

## 2018-02-09 DIAGNOSIS — E66.01 OBESITY, MORBID (HCC): ICD-10-CM

## 2018-02-09 DIAGNOSIS — K21.9 GASTROESOPHAGEAL REFLUX DISEASE, ESOPHAGITIS PRESENCE NOT SPECIFIED: ICD-10-CM

## 2018-02-09 DIAGNOSIS — E28.2 PCOS (POLYCYSTIC OVARIAN SYNDROME): ICD-10-CM

## 2018-02-09 DIAGNOSIS — G47.33 OSA (OBSTRUCTIVE SLEEP APNEA): ICD-10-CM

## 2018-02-10 LAB
25(OH)D3+25(OH)D2 SERPL-MCNC: 24.2 NG/ML (ref 30–100)
ALBUMIN SERPL-MCNC: 4.1 G/DL (ref 3.5–5.5)
ALBUMIN/GLOB SERPL: 1.3 {RATIO} (ref 1.2–2.2)
ALP SERPL-CCNC: 63 IU/L (ref 39–117)
ALT SERPL-CCNC: 15 IU/L (ref 0–32)
AST SERPL-CCNC: 20 IU/L (ref 0–40)
BASOPHILS # BLD AUTO: 0 X10E3/UL (ref 0–0.2)
BASOPHILS NFR BLD AUTO: 1 %
BILIRUB SERPL-MCNC: 0.4 MG/DL (ref 0–1.2)
BUN SERPL-MCNC: 11 MG/DL (ref 6–24)
BUN/CREAT SERPL: 13 (ref 9–23)
CALCIUM SERPL-MCNC: 10.4 MG/DL (ref 8.7–10.2)
CHLORIDE SERPL-SCNC: 101 MMOL/L (ref 96–106)
CHOLEST SERPL-MCNC: 144 MG/DL (ref 100–199)
CO2 SERPL-SCNC: 26 MMOL/L (ref 18–29)
CREAT SERPL-MCNC: 0.83 MG/DL (ref 0.57–1)
EOSINOPHIL # BLD AUTO: 0.2 X10E3/UL (ref 0–0.4)
EOSINOPHIL NFR BLD AUTO: 4 %
ERYTHROCYTE [DISTWIDTH] IN BLOOD BY AUTOMATED COUNT: 15.2 % (ref 12.3–15.4)
EST. AVERAGE GLUCOSE BLD GHB EST-MCNC: 105 MG/DL
FT4I SERPL CALC-MCNC: 1.6 (ref 1.2–4.9)
GFR SERPLBLD CREATININE-BSD FMLA CKD-EPI: 84 ML/MIN/1.73
GFR SERPLBLD CREATININE-BSD FMLA CKD-EPI: 96 ML/MIN/1.73
GLOBULIN SER CALC-MCNC: 3.1 G/DL (ref 1.5–4.5)
GLUCOSE SERPL-MCNC: 86 MG/DL (ref 65–99)
HBA1C MFR BLD: 5.3 % (ref 4.8–5.6)
HCT VFR BLD AUTO: 33.7 % (ref 34–46.6)
HDLC SERPL-MCNC: 49 MG/DL
HGB BLD-MCNC: 10.2 G/DL (ref 11.1–15.9)
IMM GRANULOCYTES # BLD: 0 X10E3/UL (ref 0–0.1)
IMM GRANULOCYTES NFR BLD: 0 %
LDLC SERPL CALC-MCNC: 84 MG/DL (ref 0–99)
LYMPHOCYTES # BLD AUTO: 1.9 X10E3/UL (ref 0.7–3.1)
LYMPHOCYTES NFR BLD AUTO: 32 %
MCH RBC QN AUTO: 23.7 PG (ref 26.6–33)
MCHC RBC AUTO-ENTMCNC: 30.3 G/DL (ref 31.5–35.7)
MCV RBC AUTO: 78 FL (ref 79–97)
MONOCYTES # BLD AUTO: 0.4 X10E3/UL (ref 0.1–0.9)
MONOCYTES NFR BLD AUTO: 8 %
NEUTROPHILS # BLD AUTO: 3.2 X10E3/UL (ref 1.4–7)
NEUTROPHILS NFR BLD AUTO: 55 %
PLATELET # BLD AUTO: 281 X10E3/UL (ref 150–379)
POTASSIUM SERPL-SCNC: 4.9 MMOL/L (ref 3.5–5.2)
PROT SERPL-MCNC: 7.2 G/DL (ref 6–8.5)
RBC # BLD AUTO: 4.31 X10E6/UL (ref 3.77–5.28)
SODIUM SERPL-SCNC: 139 MMOL/L (ref 134–144)
T3RU NFR SERPL: 27 % (ref 24–39)
T4 SERPL-MCNC: 5.8 UG/DL (ref 4.5–12)
TRIGL SERPL-MCNC: 57 MG/DL (ref 0–149)
TSH SERPL DL<=0.005 MIU/L-ACNC: 1.84 UIU/ML (ref 0.45–4.5)
VLDLC SERPL CALC-MCNC: 11 MG/DL (ref 5–40)
WBC # BLD AUTO: 5.8 X10E3/UL (ref 3.4–10.8)

## 2018-02-20 ENCOUNTER — OFFICE VISIT (OUTPATIENT)
Dept: INTERNAL MEDICINE CLINIC | Age: 48
End: 2018-02-20

## 2018-02-20 VITALS
BODY MASS INDEX: 45.58 KG/M2 | RESPIRATION RATE: 16 BRPM | OXYGEN SATURATION: 98 % | HEIGHT: 64 IN | DIASTOLIC BLOOD PRESSURE: 80 MMHG | TEMPERATURE: 98.5 F | WEIGHT: 267 LBS | SYSTOLIC BLOOD PRESSURE: 116 MMHG | HEART RATE: 64 BPM

## 2018-02-20 DIAGNOSIS — D50.8 OTHER IRON DEFICIENCY ANEMIA: ICD-10-CM

## 2018-02-20 DIAGNOSIS — E66.01 OBESITY, MORBID (HCC): ICD-10-CM

## 2018-02-20 DIAGNOSIS — Z12.11 COLON CANCER SCREENING: ICD-10-CM

## 2018-02-20 DIAGNOSIS — E28.2 PCOS (POLYCYSTIC OVARIAN SYNDROME): Primary | ICD-10-CM

## 2018-02-20 RX ORDER — DOCUSATE SODIUM 100 MG/1
100 CAPSULE, LIQUID FILLED ORAL 2 TIMES DAILY
Qty: 60 CAP | Refills: 2 | Status: SHIPPED | OUTPATIENT
Start: 2018-02-20 | End: 2018-05-21

## 2018-02-20 NOTE — PROGRESS NOTES
Chief Complaint   Patient presents with    Weight Management     1. Have you been to the ER, urgent care clinic since your last visit? Hospitalized since your last visit? No    2. Have you seen or consulted any other health care providers outside of the [address] [place] [address] since your last visit? Include any pap smears or colon screening.  No

## 2018-02-20 NOTE — PATIENT INSTRUCTIONS
It was a pleasure to see you! As discussed: The \"abnormalities\" highlighted on your metabolic panel are not clinically significant at this time. Lab Review  -Your vitamin D is a little low. Start taking an over the counter vitamin D supplement 800-1000 IU/ once a day now. If you are already taking a supplement with vitamin D double the dose (do not exceed more than 5000 international units a day)      Your labs were clinically normal/ stable. The remainder of your labs were normal .  Some labs that may have been tested and their explanation are:  Your electrolytes, kidney & liver function (Metabolic Panel)   Anemia, blood cells (CBC)  Thyroid (TSH + T4, T3)  Hormones (prolactin, vitamin D )   Pregnancy (Beta HCG)    Diabetes (Hemoglobin A1c)   Lipid Panel (Cholesterol, HDL \"good\", LDL \"bad\")     Three Goals (Big Goal: at least  3-5 lbs weight loss by next visit)  1. Keep a food log that includes grams of carbs  -Decrease carbohydrates to 150-200g/ day. --> send via nuPSYS   2. Continue   3. Increase water intake          Learning About Low-Carbohydrate Diets for Weight Loss  What is a low-carbohydrate diet? Low-carb diets avoid foods that are high in carbohydrate. These high-carb foods include pasta, bread, rice, cereal, fruits, and starchy vegetables. Instead, these diets usually have you eat foods that are high in fat and protein. Many people lose weight quickly on a low-carb diet. But the early weight loss is water. People on this diet often gain the weight back after they start eating carbs again. Not all diet plans are safe or work well. A lot of the evidence shows that low-carb diets aren't healthy. That's because these diets often don't include healthy foods like fruits and vegetables. Losing weight safely means balancing protein, fat, and carbs with every meal and snack. And low-carb diets don't always provide the vitamins, minerals, and fiber you need.   If you have a serious medical condition, talk to your doctor before you try any diet. These conditions include kidney disease, heart disease, type 2 diabetes, high cholesterol, and high blood pressure. If you are pregnant, it may not be safe for your baby if you are on a low-carb diet. How can you lose weight safely? You might have heard that a diet plan helped another person lose weight. But that doesn't mean that it will work for you. It is very hard to stay on a diet that includes lots of big changes in your eating habits. If you want to get to a healthy weight and stay there, making healthy lifestyle changes will often work better than dieting. These steps can help. · Make a plan for change. Work with your doctor to create a plan that is right for you. · See a dietitian. He or she can show you how to make healthy changes in your eating habits. · Manage stress. If you have a lot of stress in your life, it can be hard to focus on making healthy changes to your daily habits. · Track your food and activity. You are likely to do better at losing weight if you keep track of what you eat and what you do. Follow-up care is a key part of your treatment and safety. Be sure to make and go to all appointments, and call your doctor if you are having problems. It's also a good idea to know your test results and keep a list of the medicines you take. Where can you learn more? Go to http://henrietta-dwight.info/. Enter A121 in the search box to learn more about \"Learning About Low-Carbohydrate Diets for Weight Loss. \"  Current as of: May 12, 2017  Content Version: 11.4  © 2675-9321 [employer]. Care instructions adapted under license by [employer] (which disclaims liability or warranty for this information). If you have questions about a medical condition or this instruction, always ask your healthcare professional. [address] any warranty or liability for your use of this information.

## 2018-02-20 NOTE — PROGRESS NOTES
HISTORY OF PRESENT ILLNESS  Alanna Morejon is a 49 y.o. female. HPI  Cardiovascular Review:  The patient has prediabetes and obesity. Diet and Lifestyle: exercises regularly, nonsmoker, reduced carbs  diet (\"modified keto diet\")   Home BP Monitoring: is not measured at home. Pertinent ROS: taking medications as instructed, no medication side effects noted, no TIA's, no chest pain on exertion, no dyspnea on exertion, no swelling of ankles. Review of Systems   Constitutional: Negative for diaphoresis, fever and weight loss. Eyes: Negative for blurred vision and pain. Respiratory: Negative for shortness of breath. Cardiovascular: Positive for chest pain. Negative for orthopnea and leg swelling. At the end of the visit she reported an episode of sharp left-sided pain and tenderness associated with scrubbing her kitchen floor and using a lot of upper body strength. Aching pain continue for hours after the initial sharp pain. Denied any associated nausea, diaphoresis, chest pain on exertion. Neurological: Negative for focal weakness and headaches. Psychiatric/Behavioral: Negative for depression. Patient Active Problem List    Diagnosis Date Noted    Obesity, morbid ([facility] [address] 01/10/2018    JESSY (obstructive sleep apnea) 09/29/2017    Obesity 11/24/2014    GERD (gastroesophageal reflux disease) 11/24/2014    PCOS (polycystic ovarian syndrome) 11/24/2014       Current Outpatient Prescriptions   Medication Sig Dispense Refill    metFORMIN ER (GLUCOPHAGE XR) 500 mg tablet TAKE 2 TABS BY MOUTH DAILY (WITH DINNER). 180 Tab 1    raNITIdine (ZANTAC) 150 mg tablet Take 150 mg by mouth two (2) times a day.  cetirizine (ZYRTEC) 10 mg tablet Take 1 Tab by mouth daily. 30 Tab 1    fluticasone (FLONASE) 50 mcg/actuation nasal spray 2 Sprays by Both Nostrils route daily. 1 Bottle 2    MULTIVITAMIN PO Take 2 Tabs by mouth daily.          No Known Allergies   Visit Vitals    /80 (BP 1 Location: Right arm, BP Patient Position: Sitting)    Pulse 64    Temp 98.5 °F (36.9 °C) (Oral)    Resp 16    Ht 5' 3.9\" (1.623 m)    Wt 267 lb (121.1 kg)    SpO2 98%    BMI 45.97 kg/m2       Physical Exam   Constitutional: She is oriented to person, place, and time. She appears well-developed. No distress. Eyes: Conjunctivae are normal.   Neck: Neck supple. No thyromegaly present. Cardiovascular: Normal rate, regular rhythm and normal heart sounds. Pulmonary/Chest: Effort normal and breath sounds normal. No respiratory distress. She has no wheezes. She has no rales. She exhibits tenderness. Lymphadenopathy:     She has no cervical adenopathy. Neurological: She is alert and oriented to person, place, and time. Skin: Skin is warm. Psychiatric: She has a normal mood and affect. Lab Results  Component Value Date/Time   WBC 5.8 02/09/2018 03:00 PM   HGB 10.2 (L) 02/09/2018 03:00 PM   HCT 33.7 (L) 02/09/2018 03:00 PM   PLATELET 494 36/57/3305 03:00 PM   MCV 78 (L) 02/09/2018 03:00 PM     Lab Results  Component Value Date/Time   Hemoglobin A1c 5.3 02/09/2018 03:00 PM   Hemoglobin A1c 5.6 11/25/2014 08:59 AM   Glucose 86 02/09/2018 03:00 PM   LDL, calculated 84 02/09/2018 03:00 PM   Creatinine 0.83 02/09/2018 03:00 PM      Lab Results  Component Value Date/Time   Cholesterol, total 144 02/09/2018 03:00 PM   HDL Cholesterol 49 02/09/2018 03:00 PM   LDL, calculated 84 02/09/2018 03:00 PM   Triglyceride 57 02/09/2018 03:00 PM     Lab Results  Component Value Date/Time   ALT (SGPT) 15 02/09/2018 03:00 PM   AST (SGOT) 20 02/09/2018 03:00 PM   Alk.  phosphatase 63 02/09/2018 03:00 PM   Bilirubin, total 0.4 02/09/2018 03:00 PM   Albumin 4.1 02/09/2018 03:00 PM   Protein, total 7.2 02/09/2018 03:00 PM   PLATELET 415 28/75/3870 03:00 PM       Lab Results  Component Value Date/Time   GFR est non-AA 84 02/09/2018 03:00 PM   GFR est AA 96 02/09/2018 03:00 PM   Creatinine 0.83 02/09/2018 03:00 PM   BUN 11 02/09/2018 03:00 PM   Sodium 139 02/09/2018 03:00 PM   Potassium 4.9 02/09/2018 03:00 PM   Chloride 101 02/09/2018 03:00 PM   CO2 26 02/09/2018 03:00 PM     Lab Results  Component Value Date/Time   TSH 1.840 02/09/2018 03:00 PM   T3 Uptake 27 02/09/2018 03:00 PM   T4, Total 5.8 02/09/2018 03:00 PM      Lab Results   Component Value Date/Time    Glucose 86 02/09/2018 03:00 PM         ASSESSMENT and PLAN    Lab Review  Your labs were clinically normal/ stable. Some labs that may have been tested and their explanation are:  Your electrolytes, kidney & liver function (Metabolic Panel)   Anemia, blood cells (CBC)  Thyroid (TSH + T4, T3)  Hormones (prolactin, vitamin D )   Pregnancy (Beta HCG)    Diabetes (Hemoglobin A1c)   Lipid Panel (Cholesterol, HDL \"good\", LDL \"bad\")   Diagnoses and all orders for this visit:    1. PCOS (polycystic ovarian syndrome)-compliant with metformin. 2. Obesity, morbid (HCC)-she is working on weight loss and has lost 5 pounds with her efforts. She has been advised to keep a food log that includes her grams and provide this information at follow-up. 3. Other iron deficiency anemia-reports that her periods have decreased in severity. given her age will check for occult blood loss and colon cancer screening.  -     OCCULT BLOOD, IMMUNOASSAY (FIT)  -     ferrous sulfate (SLOW FE) 142 mg (45 mg iron) ER tablet; Take 1 Tab by mouth Daily (before breakfast). -     docusate sodium (COLACE) 100 mg capsule; Take 1 Cap by mouth two (2) times a day for 90 days.  -     OCCULT BLOOD, IMMUNOASSAY (FIT)    4. Colon cancer screening      Follow-up Disposition: 3 months weight management    Medication risks/benefits/costs/interactions/alternatives discussed with patient. Key Castro  was given an after visit summary which includes diagnoses, current medications, & vitals. she expressed understanding with the diagnosis and plan.

## 2018-05-18 DIAGNOSIS — E28.2 PCOS (POLYCYSTIC OVARIAN SYNDROME): ICD-10-CM

## 2018-05-18 RX ORDER — METFORMIN HYDROCHLORIDE 500 MG/1
TABLET, EXTENDED RELEASE ORAL
Qty: 180 TAB | Refills: 1 | Status: SHIPPED | OUTPATIENT
Start: 2018-05-18 | End: 2018-11-01 | Stop reason: SDUPTHER

## 2018-06-21 DIAGNOSIS — E28.2 PCOS (POLYCYSTIC OVARIAN SYNDROME): ICD-10-CM

## 2018-06-21 RX ORDER — METFORMIN HYDROCHLORIDE 500 MG/1
TABLET, EXTENDED RELEASE ORAL
Qty: 180 TAB | Refills: 1 | OUTPATIENT
Start: 2018-06-21

## 2018-08-01 ENCOUNTER — OFFICE VISIT (OUTPATIENT)
Dept: INTERNAL MEDICINE CLINIC | Age: 48
End: 2018-08-01

## 2018-08-01 VITALS
RESPIRATION RATE: 16 BRPM | DIASTOLIC BLOOD PRESSURE: 76 MMHG | WEIGHT: 274 LBS | TEMPERATURE: 98.6 F | OXYGEN SATURATION: 98 % | BODY MASS INDEX: 46.78 KG/M2 | SYSTOLIC BLOOD PRESSURE: 122 MMHG | HEIGHT: 64 IN | HEART RATE: 76 BPM

## 2018-08-01 DIAGNOSIS — K21.9 GASTROESOPHAGEAL REFLUX DISEASE, ESOPHAGITIS PRESENCE NOT SPECIFIED: ICD-10-CM

## 2018-08-01 DIAGNOSIS — E66.01 OBESITY, MORBID (HCC): ICD-10-CM

## 2018-08-01 DIAGNOSIS — E28.2 PCOS (POLYCYSTIC OVARIAN SYNDROME): Primary | ICD-10-CM

## 2018-08-01 DIAGNOSIS — Z00.00 WELL WOMAN EXAM (NO GYNECOLOGICAL EXAM): ICD-10-CM

## 2018-08-01 NOTE — PATIENT INSTRUCTIONS
It was a pleasure to see you! As discussed:    Congratulations on your exercising and positive lifestyle changes!!!    .     Learning About Low-Carbohydrate Diets for Weight Loss  What is a low-carbohydrate diet? Low-carb diets avoid foods that are high in carbohydrate. These high-carb foods include pasta, bread, rice, cereal, fruits, and starchy vegetables. Instead, these diets usually have you eat foods that are high in fat and protein. Many people lose weight quickly on a low-carb diet. But the early weight loss is water. People on this diet often gain the weight back after they start eating carbs again. Not all diet plans are safe or work well. A lot of the evidence shows that low-carb diets aren't healthy. That's because these diets often don't include healthy foods like fruits and vegetables. Losing weight safely means balancing protein, fat, and carbs with every meal and snack. And low-carb diets don't always provide the vitamins, minerals, and fiber you need. If you have a serious medical condition, talk to your doctor before you try any diet. These conditions include kidney disease, heart disease, type 2 diabetes, high cholesterol, and high blood pressure. If you are pregnant, it may not be safe for your baby if you are on a low-carb diet. How can you lose weight safely? You might have heard that a diet plan helped another person lose weight. But that doesn't mean that it will work for you. It is very hard to stay on a diet that includes lots of big changes in your eating habits. If you want to get to a healthy weight and stay there, making healthy lifestyle changes will often work better than dieting. These steps can help. · Make a plan for change. Work with your doctor to create a plan that is right for you. · See a dietitian. He or she can show you how to make healthy changes in your eating habits. · Manage stress.  If you have a lot of stress in your life, it can be hard to focus on making healthy changes to your daily habits. · Track your food and activity. You are likely to do better at losing weight if you keep track of what you eat and what you do. Follow-up care is a key part of your treatment and safety. Be sure to make and go to all appointments, and call your doctor if you are having problems. It's also a good idea to know your test results and keep a list of the medicines you take. Where can you learn more? Go to http://henrietta-dwight.info/. Enter A121 in the search box to learn more about \"Learning About Low-Carbohydrate Diets for Weight Loss. \"  Current as of: May 12, 2017  Content Version: 11.7  © 6470-1265 [employer]. Care instructions adapted under license by [employer] (which disclaims liability or warranty for this information). If you have questions about a medical condition or this instruction, always ask your healthcare professional. [address] any warranty or liability for your use of this information.

## 2018-08-01 NOTE — PROGRESS NOTES
Chief Complaint   Patient presents with    PCOS     1. Have you been to the ER, urgent care clinic since your last visit? Hospitalized since your last visit? No    2. Have you seen or consulted any other health care providers outside of the [address] [place] [address] since your last visit? Include any pap smears or colon screening.  No    complains of dull pain lower back

## 2018-08-01 NOTE — PROGRESS NOTES
HISTORY OF PRESENT ILLNESS  Kacey Humphrey is a 51 y.o. female. Back Pain    The current episode started more than 1 week ago (3 months ). The problem has not changed since onset. Episode frequency: intermittently  Associated with: started exercise program  The pain is present in the lower back. The quality of the pain is described as dull. The pain is moderate. The symptoms are aggravated by certain positions. Associated symptoms include leg pain (resolved ). Pertinent negatives include no chest pain, no fever, no abdominal swelling, no dysuria, no paresthesias, no paresis and no tingling. She has tried NSAIDs and analgesics for the symptoms. Risk factors include obesity (started exercise program). Cardiovascular Review:  The patient has obesity and PCOS. Diet and Lifestyle: exercises regularly, nonsmoker  Home BP Monitoring: is not measured at home. Pertinent ROS: taking medications as instructed, no medication side effects noted, no TIA's, no chest pain on exertion, no dyspnea on exertion, no swelling of ankles. Review of Systems   Constitutional: Negative for fever. Cardiovascular: Negative for chest pain. Genitourinary: Negative for dysuria. Musculoskeletal: Positive for back pain. Neurological: Negative for tingling and paresthesias. Patient Active Problem List    Diagnosis Date Noted    Obesity, morbid ([address] 01/10/2018    JESSY (obstructive sleep apnea) 09/29/2017    Obesity 11/24/2014    GERD (gastroesophageal reflux disease) 11/24/2014    PCOS (polycystic ovarian syndrome) 11/24/2014       Current Outpatient Prescriptions   Medication Sig Dispense Refill    calcium carbonate (CALCIUM 600 PO) Take 1 Tab by mouth daily.  metFORMIN ER (GLUCOPHAGE XR) 500 mg tablet TAKE 2 TABS BY MOUTH DAILY (WITH DINNER). 180 Tab 1    ferrous sulfate (SLOW FE) 142 mg (45 mg iron) ER tablet Take 1 Tab by mouth Daily (before breakfast).  90 Tab 1    raNITIdine (ZANTAC) 150 mg tablet Take 150 mg by mouth two (2) times a day.  cetirizine (ZYRTEC) 10 mg tablet Take 1 Tab by mouth daily. 30 Tab 1    MULTIVITAMIN PO Take 2 Tabs by mouth daily.  fluticasone (FLONASE) 50 mcg/actuation nasal spray 2 Sprays by Both Nostrils route daily. 1 Bottle 2       No Known Allergies   Visit Vitals    /76 (BP 1 Location: Right arm, BP Patient Position: Sitting)    Pulse 76    Temp 98.6 °F (37 °C) (Oral)    Resp 16    Ht 5' 3.9\" (1.623 m)    Wt 274 lb (124.3 kg)    SpO2 98%    BMI 47.18 kg/m2       Physical Exam   Constitutional: She is oriented to person, place, and time. She appears well-developed. No distress. Cardiovascular: Normal rate, regular rhythm and normal heart sounds. Pulmonary/Chest: Effort normal and breath sounds normal. No respiratory distress. She has no wheezes. She has no rales. She exhibits no tenderness. Musculoskeletal: She exhibits no edema (BLE ). Neurological: She is alert and oriented to person, place, and time. Skin: Skin is warm. Psychiatric: She has a normal mood and affect. ASSESSMENT and PLAN  Diagnoses and all orders for this visit:    1. PCOS (polycystic ovarian syndrome)- would benefit from seeing nutritionist. Working hard on exercise. Work on diet optimization. Continue metformin.   -     REFERRAL TO NUTRITION    2. Obesity, morbid ([address]- I have reviewed/discussed the above normal BMI with the patient. I have recommended the following interventions: dietary management education, guidance, and counseling . .      -     REFERRAL TO NUTRITION    3. Gastroesophageal reflux disease, esophagitis presence not specified- stable. 4. Well woman exam (no gynecological exam)  -     [facility] MAMMO BI SCREENING INCL CAD;  Future  -     METABOLIC PANEL, COMPREHENSIVE  -     TSH 3RD GENERATION  -     T4, FREE  -     VITAMIN D, 25 HYDROXY  -     CBC W/O DIFF  -     LIPID PANEL    Follow-up Disposition:  Return in about 3 months (around 11/1/2018) for Physical with Pap - 30 minute appointment. Medication risks/benefits/costs/interactions/alternatives discussed with patient. Mor Kothari  was given an after visit summary which includes diagnoses, current medications, & vitals. she expressed understanding with the diagnosis and plan.

## 2018-08-28 ENCOUNTER — HOSPITAL ENCOUNTER (OUTPATIENT)
Dept: MAMMOGRAPHY | Age: 48
Discharge: HOME | End: 2018-08-28
Attending: INTERNAL MEDICINE
Payer: COMMERCIAL

## 2018-08-28 DIAGNOSIS — Z00.00 WELL WOMAN EXAM (NO GYNECOLOGICAL EXAM): ICD-10-CM

## 2018-08-28 PROCEDURE — 77067 SCR MAMMO BI INCL CAD: CPT

## 2018-09-04 ENCOUNTER — TELEPHONE (OUTPATIENT)
Dept: INTERNAL MEDICINE CLINIC | Age: 48
End: 2018-09-04

## 2018-09-04 DIAGNOSIS — M54.5 LOW BACK PAIN, UNSPECIFIED BACK PAIN LATERALITY, UNSPECIFIED CHRONICITY, WITH SCIATICA PRESENCE UNSPECIFIED: Primary | ICD-10-CM

## 2018-09-04 NOTE — TELEPHONE ENCOUNTER
Andra Weinberg (Self) [phone]      Pt says that at her last appt dr Bashir Walker was suppose to give her a recommendation for physical therapy, but she did not give it to her.

## 2018-09-10 ENCOUNTER — HOSPITAL ENCOUNTER (OUTPATIENT)
Dept: PHYSICAL THERAPY | Age: 48
Discharge: HOME | End: 2018-09-10
Payer: COMMERCIAL

## 2018-09-10 PROCEDURE — 97161 PT EVAL LOW COMPLEX 20 MIN: CPT

## 2018-09-10 PROCEDURE — 97110 THERAPEUTIC EXERCISES: CPT

## 2018-09-10 NOTE — PROGRESS NOTES
[facility]  [address] [address]  Phone: [phone]  Fax: [phone]    Plan of Care/Statement of Necessity for Physical Therapy Services  2-15    Patient name: Avi Crigler  : 1970  Provider#: [id number]  Referral source: [facility],*      Medical/Treatment Diagnosis: Low back pain [M54.5]     Prior Hospitalization: see medical history     Comorbidities: obesity, GERD, PCOS, JESSY  Prior Level of Function: pt works full-time as a manager for the [employer]; pt completes 20 minutes of exercise at least 3x/week  Medications: Verified on Patient Summary List    Start of Care: 09/10/2018      Onset Date: May 2018       The Plan of Care and following information is based on the information from the initial evaluation. Assessment/ key information: Pt is a 51year old female who is referred to Physical Therapy by Dr. Catie Doyle with a diagnosis of LBP. Pt demonstrates decreased lumbar ROM; pain in left low back with backward bending and rotation left. Pt is a poor pelvic girdle stabilizer. Tightness noted in left piriformis and quadratus lumborum muscles. Patient will benefit from skilled PT services to modify and progress therapeutic interventions, address functional mobility deficits, address ROM deficits, address strength deficits, analyze and address soft tissue restrictions, analyze and cue movement patterns, analyze and modify body mechanics/ergonomics and assess and modify postural abnormalities to attain pt/PT goals.     Evaluation Complexity History MEDIUM  Complexity : 1-2 comorbidities / personal factors will impact the outcome/ POC ; Examination HIGH Complexity : 4+ Standardized tests and measures addressing body structure, function, activity limitation and / or participation in recreation  ;Presentation LOW Complexity : Stable, uncomplicated  ;Clinical Decision Making MEDIUM Complexity : FOTO score of 26-74  Overall Complexity Rating: LOW     Problem List: pain affecting function, decrease ROM, decrease strength, impaired gait/ balance, decrease ADL/ functional abilitiies, decrease activity tolerance, decrease flexibility/ joint mobility and decrease transfer abilities   Treatment Plan may include any combination of the following: Therapeutic exercise, Therapeutic activities, Neuromuscular re-education, Physical agent/modality, Gait/balance training, Manual therapy and Patient education  Patient / Family readiness to learn indicated by: asking questions, trying to perform skills and interest  Persons(s) to be included in education: patient (P)  Barriers to Learning/Limitations: None  Patient Goal (s): No more pain.   Patient Self Reported Health Status: good  Rehabilitation Potential: good    Short/Long Term Goals: To be accomplished in 4 weeks:  1) Pt will be Independent with HEP. 2) Pt will report resolution of left LE radicular pain. 3) Pt will return to working out without pain or limitation. Frequency / Duration: Patient to be seen 2 times per week for 4 weeks. Patient/ Caregiver education and instruction: self care, activity modification and exercises    [x]  Plan of care has been reviewed with SREEDHAR Hsieh PT, DPT   9/10/2018 1:14 PM    ________________________________________________________________________    I certify that the above Therapy Services are being furnished while the patient is under my care. I agree with the treatment plan and certify that this therapy is necessary.     [de-identified] Signature:____________________  Date:____________Time: _________

## 2018-09-10 NOTE — PROGRESS NOTES
PT INITIAL EVALUATION NOTE - [facility] -15    Patient Name: Shahid Garnett  Date:9/10/2018  : 1970  [x]  Patient  Verified  Payor: [employer] [place] / Plan: Cayla Tejeda [id number] PPO / Product Type: PPO /    In time:1:20pm  Out time:2:20pm  Total Treatment Time (min): 60  Total Timed Codes (min): 15  1:1 Treatment Time ( only): --   Visit #: 1     Treatment Area: Low back pain [M54.5]    SUBJECTIVE  Pain Level (0-10 scale): 3/10  Any medication changes, allergies to medications, adverse drug reactions, diagnosis change, or new procedure performed?: [] No    [x] Yes (see summary sheet for update)  Subjective:    Pt started working out- participating in a boot camp last September. About 3-4 months ago, while working out, pt felt strain in low back- tried to stretch but kept exercising. Pt complains of constant pain in LB and occasional pain that radiates down left leg to foot. Stopped working out about 2 months ago and pain has gotten worse. PLOF: intermittent LBP since May 2018  Mechanism of Injury: working out- bootcamp  Previous Treatment/Compliance: rest, Aleve, heat  PMHx/Surgical Hx:  Obesity, GERD, PCOS, JESSY  Work Hx: pt works full-time as manager for the [employer] Situation: pt lives alone. Pt Goals: \"No more pain. \"  Barriers: obesity, pain  Motivation: high  Substance use: none  FABQ Score: low      OBJECTIVE/EXAMINATION  Posture:   In sitting, weight shifted to the right  Gait and Functional Mobility:  Antalgic left  Palpation: tenderness to palpation left QL, left piriformis        Lumbar AROM:        Flexion    Full, pain-free     Extension   Full, pain at end-range      R  L      Side Bending   Full  Full    Rotation   Full  Full, pain at end-range      LOWER QUARTER   MUSCLE STRENGTH  KEY       R  L  0 - No Contraction  L1, L2 Psoas  5/5  5/5  1 - Trace   L3 Quads  5/5  5/5  2 - Poor   L4 Tib Ant  5/5  5/5  3 - Fair    L5 EHL  5/5  5/5  4 - Good   S1 Peroneals  5/5  5/5  5 - Normal   S2 Hams  5/5  5/5    Mobility Assessment: guarded left hip ER      MMT: left              HIP Ext: 4+/5              HIP Abd: 4+/5  Neurological: Reflexes / Sensations: numbness left lateral thigh  Special Tests:    Trendelenberg: negative    FABERS: positive left   Forward Bend: negative    Slump: positive   H.S. SLR: positive     Piriformis Ext: positive   Long Sit: negative     Lumbar Distraction: positive   SI Compression/Distraction: negative  Standing March Test: positive    Modality rationale: decrease inflammation and decrease pain to improve the patients ability to return to working out   [employer] Type Additional Details    [] Estim: []Att   []Unatt        []TENS instruct                  []IFC  []Premod   []NMES                     []Other:  []w/US   []w/ice   []w/heat  Position:  Location:    []  Traction: [] Cervical       []Lumbar                       [] Prone          []Supine                       []Intermittent   []Continuous Lbs:  [] before manual  [] after manual  []w/heat    []  Ultrasound: []Continuous   [] Pulsed at:                           []1MHz   []3MHz Location:  W/cm2:    [] Paraffin         Location:   []w/heat   10 [x]  Ice     []  Heat  []  Ice massage Position: right side-lying with pillow between knees  Location: left QL at end of sesison    []  Laser  []  Other: Position:  Location:      []  Vasopneumatic Device Pressure:       [] lo [] med [] hi   Temperature:    [x] Skin assessment post-treatment:  [x]intact []redness- no adverse reaction    []redness - adverse reaction:     15 min Therapeutic Exercise:  [x] See flow sheet :   Rationale: increase ROM, increase strength and improve coordination to improve the patients ability to return to working out          With   [] TE   [] TA   [] neuro   [] other: Patient Education: [x] Review HEP- pt given handout with exercises for HEP    [] Progressed/Changed HEP based on:   [] positioning   [] body mechanics [] transfers   [] heat/ice application    [] other:      Other Objective/Functional Measures: FOTO: 65/100    Pain Level (0-10 scale) post treatment: 1/10    ASSESSMENT/Changes in Function:   Pt is a 51year old female who is referred to Physical Therapy by Dr. Eusebio Alcantara with a diagnosis of LBP. Pt demonstrates decreased lumbar ROM; pain in left low back with backward bending and rotation left. Pt is a poor pelvic girdle stabilizer. Tightness noted in left piriformis and quadratus lumborum muscles. Patient will benefit from skilled PT services to modify and progress therapeutic interventions, address functional mobility deficits, address ROM deficits, address strength deficits, analyze and address soft tissue restrictions, analyze and cue movement patterns, analyze and modify body mechanics/ergonomics and assess and modify postural abnormalities to attain pt/PT goals.     [x]  See Plan of [address] PT, DPT   9/10/2018  1:14 PM

## 2018-09-18 ENCOUNTER — APPOINTMENT (OUTPATIENT)
Dept: PHYSICAL THERAPY | Age: 48
End: 2018-09-18
Payer: COMMERCIAL

## 2018-11-01 ENCOUNTER — HOSPITAL ENCOUNTER (OUTPATIENT)
Dept: LAB | Age: 48
Discharge: HOME | End: 2018-11-01
Payer: COMMERCIAL

## 2018-11-01 ENCOUNTER — OFFICE VISIT (OUTPATIENT)
Dept: INTERNAL MEDICINE CLINIC | Age: 48
End: 2018-11-01

## 2018-11-01 VITALS
RESPIRATION RATE: 18 BRPM | TEMPERATURE: 97.4 F | SYSTOLIC BLOOD PRESSURE: 132 MMHG | DIASTOLIC BLOOD PRESSURE: 82 MMHG | HEIGHT: 64 IN | OXYGEN SATURATION: 100 % | WEIGHT: 279.6 LBS | BODY MASS INDEX: 47.73 KG/M2 | HEART RATE: 62 BPM

## 2018-11-01 DIAGNOSIS — Z01.419 WELL WOMAN EXAM WITH ROUTINE GYNECOLOGICAL EXAM: Primary | ICD-10-CM

## 2018-11-01 DIAGNOSIS — E28.2 PCOS (POLYCYSTIC OVARIAN SYNDROME): ICD-10-CM

## 2018-11-01 DIAGNOSIS — L30.8 OTHER ECZEMA: ICD-10-CM

## 2018-11-01 DIAGNOSIS — Z12.4 SCREENING FOR MALIGNANT NEOPLASM OF CERVIX: ICD-10-CM

## 2018-11-01 DIAGNOSIS — R60.0 LOCALIZED EDEMA: ICD-10-CM

## 2018-11-01 PROCEDURE — 88175 CYTOPATH C/V AUTO FLUID REDO: CPT | Performed by: INTERNAL MEDICINE

## 2018-11-01 PROCEDURE — 87624 HPV HI-RISK TYP POOLED RSLT: CPT | Performed by: INTERNAL MEDICINE

## 2018-11-01 PROCEDURE — 87491 CHLMYD TRACH DNA AMP PROBE: CPT | Performed by: INTERNAL MEDICINE

## 2018-11-01 RX ORDER — SPIRONOLACTONE 50 MG/1
50 TABLET, FILM COATED ORAL DAILY
Qty: 30 TAB | Refills: 5 | Status: SHIPPED | OUTPATIENT
Start: 2018-11-01 | End: 2019-06-12 | Stop reason: SDUPTHER

## 2018-11-01 RX ORDER — METFORMIN HYDROCHLORIDE 500 MG/1
TABLET, EXTENDED RELEASE ORAL
Qty: 180 TAB | Refills: 1 | Status: SHIPPED | OUTPATIENT
Start: 2018-11-01 | End: 2019-10-03 | Stop reason: SDUPTHER

## 2018-11-01 NOTE — PATIENT INSTRUCTIONS
It was a pleasure to see you! As discussed: We started a new medication for your PCOS and swelling. Complete labs in 2 weeks to check your kidney function. Learning About Cervical Cancer Screening What is a cervical cancer screening test? 
 
Cervical cancer screening tests check for cancer of the cervix. The cervix is the lower part of the uterus that opens into the vagina. The test can help your doctor find early changes in the cells that could lead to cancer. Two tests can be used to screen for cervical cancer. They may be used alone or together. A Pap test.  
This test looks for changes in the cells of the cervix. Abnormal cells may be a sign of cancer. A human papillomavirus (HPV) test.  
This test looks for the HPV virus. Some types of HPV can cause cancer. During either test, the doctor or nurse will insert a tool called a speculum into your vagina. The speculum gently spreads apart the vaginal walls. It allows your doctor to see inside the vagina and the cervix. He or she uses a small swab or brush to collect cell samples from your cervix. Try to schedule the test when you're not having your period. To get ready for the test, avoid douches, tampons, vaginal medicines, sprays, and powders for at least a day before you have the test. 
When should you have a screening test? 
These guidelines apply to women who are at average risk of cervical cancer. If you don't know your risk, talk with your doctor. Women 21 to 34 
· You can start having a Pap test at age 24. It is done every 3 years. · You can start having the primary HPV test at age 22. It is done every 3 years. Women 30 to 59 · If you had both a Pap test and an HPV test last time and both were normal, you can have a Pap plus an HPV test every 5 years. · If you had only a Pap test last time, as long as your results are normal, you can have a Pap test every 3 years.  
· If you had only an HPV test last time, as long as your results are normal, you can have an HPV test every 3 years. Women 72 and older · If you are age 72 or older, talk with your doctor about what's right for you. Women ages 72 and older may no longer need to be screened for cervical cancer. When to stop having screening tests depends on your medical history, your overall health, and your risk of cervical cell changes or cervical cancer. What happens after the test? 
The sample of cells taken during your test will be sent to a lab so that an expert can look at the cells. It usually takes a week or two to get the results back. Pap tests · A normal result means that the test didn't find any abnormal cells in the sample. · An abnormal result can mean many things. Most of these aren't cancer. The results of your test may be abnormal because: ? You have an infection of the vagina or cervix, such as a yeast infection. ? You have an IUD (intrauterine device for birth control). ? You have low estrogen levels after menopause that are causing the cells to change. ? You have cell changes that may be a sign of precancer or cancer. The results are ranked based on how serious the changes might be. HPV tests · A normal result means that the test didn't find any high-risk HPV in the sample. · An abnormal HPV test doesn't mean that you have cervical cancer. It may mean that you are infected with one or more high-risk types of HPV. This increases your chance of having cell changes that may be a sign of precancer or cancer. Follow-up care is a key part of your treatment and safety. Be sure to make and go to all appointments, and call your doctor if you are having problems. It's also a good idea to know your test results and keep a list of the medicines you take. Where can you learn more? Go to http://henrietta-dwight.info/. Enter P919 in the search box to learn more about \"Learning About Cervical Cancer Screening. \" Current as of: March 28, 2018 Content Version: 11.8 © 6909-1060 Present. Care instructions adapted under license by [employer] (which disclaims liability or warranty for this information). If you have questions about a medical condition or this instruction, always ask your healthcare professional. [address] any warranty or liability for your use of this information. Leg and Ankle Edema: Care Instructions Your Care Instructions Swelling in the legs, ankles, and feet is called edema. It is common after you sit or stand for a while. Long plane flights or car rides often cause swelling in the legs and feet. You may also have swelling if you have to stand for long periods of time at your job. Problems with the veins in the legs (varicose veins) and changes in hormones can also cause swelling. Sometimes the swelling in the ankles and feet is caused by a more serious problem, such as heart failure, infection, blood clots, or liver or kidney disease. Follow-up care is a key part of your treatment and safety. Be sure to make and go to all appointments, and call your doctor if you are having problems. It's also a good idea to know your test results and keep a list of the medicines you take. How can you care for yourself at home? · If your doctor gave you medicine, take it as prescribed. Call your doctor if you think you are having a problem with your medicine. · Whenever you are resting, raise your legs up. Try to keep the swollen area higher than the level of your heart. · Take breaks from standing or sitting in one position. ? Walk around to increase the blood flow in your lower legs. ? Move your feet and ankles often while you stand, or tighten and relax your leg muscles. · Wear support stockings. Put them on in the morning, before swelling gets worse. · Eat a balanced diet. Lose weight if you need to. · Limit the amount of salt (sodium) in your diet.  Salt holds fluid in the body and may increase swelling. When should you call for help? Call 911 anytime you think you may need emergency care. For example, call if: 
  · You have symptoms of a blood clot in your lung (called a pulmonary embolism). These may include: 
? Sudden chest pain. ? Trouble breathing. ? Coughing up blood.  
 Call your doctor now or seek immediate medical care if: 
  · You have signs of a blood clot, such as: 
? Pain in your calf, back of the knee, thigh, or groin. ? Redness and swelling in your leg or groin.  
  · You have symptoms of infection, such as: 
? Increased pain, swelling, warmth, or redness. ? Red streaks or pus. ? A fever.  
 Watch closely for changes in your health, and be sure to contact your doctor if: 
  · Your swelling is getting worse.  
  · You have new or worsening pain in your legs.  
  · You do not get better as expected. Where can you learn more? Go to http://henrietta-dwight.info/. Enter D833 in the search box to learn more about \"Leg and Ankle Edema: Care Instructions. \" Current as of: November 20, 2017 Content Version: 11.8 © 2052-6632 [employer]wise[employer]orporated. Care instructions adapted under license by [employer] (which disclaims liability or warranty for this information). If you have questions about a medical condition or this instruction, always ask your healthcare professional. [place] [address] any warranty or liability for your use of this information.

## 2018-11-01 NOTE — PROGRESS NOTES
Chief Complaint Patient presents with  Well Woman  
  w/pap 1. Have you been to the ER, urgent care clinic since your last visit? Hospitalized since your last visit? No 
 
2. Have you seen or consulted any other health care providers outside of the [address] [place] [address] since your last visit? Include any pap smears or colon screening.  No

## 2018-11-01 NOTE — PROGRESS NOTES
Subjective:  
51 y.o. female for Well Woman Check. Patient's last menstrual period was 10/18/2018 (exact date). Social History: not sexually active. Pertinent past medical hstory: no history of HTN, DVT, CAD, DM, liver disease, migraines or smoking. Patient Active Problem List  
 Diagnosis Date Noted  Obesity, morbid ([facility] [address] 01/10/2018  JESSY (obstructive sleep apnea) 09/29/2017  Obesity 11/24/2014  GERD (gastroesophageal reflux disease) 11/24/2014  PCOS (polycystic ovarian syndrome) 11/24/2014 Current Outpatient Medications Medication Sig Dispense Refill  calcium carbonate (CALCIUM 600 PO) Take 1 Tab by mouth daily.  metFORMIN ER (GLUCOPHAGE XR) 500 mg tablet TAKE 2 TABS BY MOUTH DAILY (WITH DINNER). 180 Tab 1  
 ferrous sulfate (SLOW FE) 142 mg (45 mg iron) ER tablet Take 1 Tab by mouth Daily (before breakfast). 90 Tab 1  raNITIdine (ZANTAC) 150 mg tablet Take 150 mg by mouth nightly.  cetirizine (ZYRTEC) 10 mg tablet Take 1 Tab by mouth daily. 30 Tab 1  
 fluticasone (FLONASE) 50 mcg/actuation nasal spray 2 Sprays by Both Nostrils route daily. 1 Bottle 2  
 MULTIVITAMIN PO Take 2 Tabs by mouth daily. No Known Allergies Past Medical History:  
Diagnosis Date  GERD (gastroesophageal reflux disease) 11/24/2014  Obesity 11/24/2014  PCOS (polycystic ovarian syndrome) 11/24/2014 Family History Problem Relation Age of Onset  Cancer Mother   
     clear cell  Hypertension Mother  Hypertension Father Social History Tobacco Use  Smoking status: Never Smoker  Smokeless tobacco: Never Used Substance Use Topics  Alcohol use: No  
  
 
ROS:  Feeling well. No dyspnea or chest pain on exertion. No abdominal pain, change in bowel habits, black or bloody stools. No urinary tract symptoms.  GYN ROS: normal menses, no abnormal bleeding, pelvic pain or discharge, no breast pain or new or enlarging lumps on self exam, no vaginal bleeding, no discharge or pelvic pain, she complains of recurrent pruritic patch on left. No neurological complaints. Objective:  
 
Visit Vitals /82 (BP 1 Location: Left arm, BP Patient Position: Sitting) Pulse 62 Temp 97.4 °F (36.3 °C) (Oral) Resp 18 Ht 5' 3.9\" (1.623 m) Wt 279 lb 9.6 oz (126.8 kg) LMP 10/18/2018 (Exact Date) SpO2 100% BMI 48.14 kg/m² The patient appears well, alert, oriented x 3, in no distress. ENT normal.  Neck supple. No adenopathy or thyromegaly. INES. Lungs are clear, good air entry, no wheezes, rhonchi or rales. S1 and S2 normal, no murmurs, regular rate and rhythm. Abdomen soft without tenderness, guarding, mass or organomegaly. Extremities show no edema, normal peripheral pulses. Neurological is normal, no focal findings. BREAST EXAM: risk and benefit of breast self-exam was discussed, left breast dry non erythematous skin. PELVIC EXAM: normal external genitalia, vulva, vagina, cervix, uterus and adnexa, exam chaperoned by Isabel Hinkle LPN Lab Results Component Value Date/Time WBC 5.8 02/09/2018 03:00 PM  
 HGB 10.2 (L) 02/09/2018 03:00 PM  
 HCT 33.7 (L) 02/09/2018 03:00 PM  
 PLATELET 177 71/29/4956 03:00 PM  
 MCV 78 (L) 02/09/2018 03:00 PM  
 
Lab Results Component Value Date/Time Hemoglobin A1c 5.3 02/09/2018 03:00 PM  
 Hemoglobin A1c 5.6 11/25/2014 08:59 AM  
 Glucose 86 02/09/2018 03:00 PM  
 LDL, calculated 84 02/09/2018 03:00 PM  
 Creatinine 0.83 02/09/2018 03:00 PM  
  
Lab Results Component Value Date/Time Cholesterol, total 144 02/09/2018 03:00 PM  
 HDL Cholesterol 49 02/09/2018 03:00 PM  
 LDL, calculated 84 02/09/2018 03:00 PM  
 Triglyceride 57 02/09/2018 03:00 PM  
 
Lab Results Component Value Date/Time ALT (SGPT) 15 02/09/2018 03:00 PM  
 AST (SGOT) 20 02/09/2018 03:00 PM  
 Alk.  phosphatase 63 02/09/2018 03:00 PM  
 Bilirubin, total 0.4 02/09/2018 03:00 PM  
 Albumin 4.1 02/09/2018 03:00 PM  
 Protein, total 7.2 02/09/2018 03:00 PM  
 PLATELET 135 20/94/4182 03:00 PM  
 
Lab Results Component Value Date/Time GFR est non-AA 84 02/09/2018 03:00 PM  
 GFR est AA 96 02/09/2018 03:00 PM  
 Creatinine 0.83 02/09/2018 03:00 PM  
 BUN 11 02/09/2018 03:00 PM  
 Sodium 139 02/09/2018 03:00 PM  
 Potassium 4.9 02/09/2018 03:00 PM  
 Chloride 101 02/09/2018 03:00 PM  
 CO2 26 02/09/2018 03:00 PM  
 
Lab Results Component Value Date/Time TSH 1.840 02/09/2018 03:00 PM  
 T3 Uptake 27 02/09/2018 03:00 PM  
 T4, Total 5.8 02/09/2018 03:00 PM  
  
Lab Results Component Value Date/Time Glucose 86 02/09/2018 03:00 PM  
   
 
Assessment/Plan:  
well woman Diagnoses and all orders for this visit: 
 
1. Well woman exam with routine gynecological exam- Health Maintenance reviewed and addressed as ordered below -     PAP IG, CT-NG-TV, APTIMA HPV AND RFX 43/21,77([id number]) 2. PCOS (polycystic ovarian syndrome)- add aldactone for hiruitism and edema. Check GFR and K in 2 weeks after initation 
-     metFORMIN ER (GLUCOPHAGE XR) 500 mg tablet; TAKE 2 TABS BY MOUTH DAILY (WITH DINNER). -     spironolactone (ALDACTONE) 50 mg tablet; Take 1 Tab by mouth daily. 3. Localized edema- see above. -     spironolactone (ALDACTONE) 50 mg tablet; Take 1 Tab by mouth daily. 
-     METABOLIC PANEL, COMPREHENSIVE 
-     MAGNESIUM 4. Screening for malignant neoplasm of cervix- completed in office 5. Other eczema- on trunk and hand. Advised to notify office if worrisome symptoms develop on breast- erythema or friability. Follow-up Disposition: 
Return in about 6 months (around 5/1/2019) for Follow-up. Medication risks/benefits/costs/interactions/alternatives discussed with patient. Vianey Mueller  was given an after visit summary which includes diagnoses, current medications, & vitals. she expressed understanding with the diagnosis and plan.

## 2018-11-05 LAB
C TRACH RRNA SPEC QL NAA+PROBE: NEGATIVE
N GONORRHOEA RRNA SPEC QL NAA+PROBE: NEGATIVE
SPECIMEN SOURCE: NORMAL
T VAGINALIS RRNA SPEC QL NAA+PROBE: NEGATIVE

## 2018-11-07 NOTE — PROGRESS NOTES
Please let Nella Dumont know her pap smear was abnormal. It is likely from a bacterial infection called Bacterial vaginosis. I recommend we treat the infection and recheck her pap in 6 months.    Please order  0.75 percent metronidazole inserted as 5 grams of gel once daily for five days  Thanks

## 2018-11-09 ENCOUNTER — TELEPHONE (OUTPATIENT)
Dept: INTERNAL MEDICINE CLINIC | Age: 48
End: 2018-11-09

## 2018-11-09 DIAGNOSIS — N76.0 BACTERIAL VAGINOSIS: Primary | ICD-10-CM

## 2018-11-09 DIAGNOSIS — B96.89 BACTERIAL VAGINOSIS: Primary | ICD-10-CM

## 2018-11-09 RX ORDER — METRONIDAZOLE 7.5 MG/G
1 GEL VAGINAL
Qty: 187.5 MG | Refills: 0 | Status: SHIPPED | OUTPATIENT
Start: 2018-11-09 | End: 2018-11-14

## 2018-11-09 NOTE — TELEPHONE ENCOUNTER
----- Message from Tiffanie Gallagher MD sent at 11/7/2018  2:48 PM EST -----  Please let Maxine Tim know her pap smear was abnormal. It is likely from a bacterial infection called Bacterial vaginosis. I recommend we treat the infection and recheck her pap in 6 months.    Please order  0.75 percent metronidazole inserted as 5 grams of gel once daily for five days  Thanks

## 2019-03-20 NOTE — ANCILLARY DISCHARGE INSTRUCTIONS
[facility]  [address] [address]  Phone: [phone]  Fax: [phone]    Discharge Summary  2-15    Patient name: Manjula Friend  : 1970  Provider#: [id number]  Referral source: Adria Fierro,*      Medical/Treatment Diagnosis: Low back pain [M54.5]     Prior Hospitalization: see medical history     Comorbidities: obesity, GERD, PCOS, JESSY  Prior Level of Function: pt works full-time as a manager for the [employer]; pt completes 20 minutes of exercise at least 3x/week  Medications: Verified on Patient Summary List    Start of Care: 09/10/2018     Onset Date:May 2018   Visits from Start of Care: 1     Missed Visits: 4  Reporting Period : 09/10/2018 to 09/10/2018    Short/Long Term Goals: To be accomplished in 4 weeks:  1) Pt will be Independent with HEP. NOT MET  2) Pt will report resolution of left LE radicular pain. NOT MET  3) Pt will return to working out without pain or limitation. NOT MET    ASSESSMENT/SUMMARY OF CARE: Pt participated in initial PT evaluation on 09/10/2018, for LBP. Initiated treatment with therapeutic exercise, cryotherapy, pt education, and home exercise program.  Pt failed to return to PT (did not show for 4 consecutive appointments); therefore, discharged.     RECOMMENDATIONS:  [x]Discontinue therapy: []Patient has reached or is progressing toward set goals      [x]Patient is non-compliant or has abdicated      []Due to lack of appreciable progress towards set goals    Andriy Lugo, PT, DPT  3/20/2019

## 2019-06-11 ENCOUNTER — OFFICE VISIT (OUTPATIENT)
Dept: SLEEP MEDICINE | Age: 49
End: 2019-06-11

## 2019-06-11 VITALS
WEIGHT: 273.8 LBS | HEART RATE: 71 BPM | HEIGHT: 64 IN | OXYGEN SATURATION: 98 % | SYSTOLIC BLOOD PRESSURE: 129 MMHG | BODY MASS INDEX: 46.74 KG/M2 | DIASTOLIC BLOOD PRESSURE: 77 MMHG

## 2019-06-11 DIAGNOSIS — G47.33 OSA (OBSTRUCTIVE SLEEP APNEA): Primary | ICD-10-CM

## 2019-06-11 DIAGNOSIS — E28.2 PCOS (POLYCYSTIC OVARIAN SYNDROME): ICD-10-CM

## 2019-06-11 NOTE — PROGRESS NOTES
[address] [place]. [place], [address]   Tel.  [phone]   Fax. [address] [facility] [address]   [place], [address] [facility]   Tel.  [phone]   Fax. [phone] [address] [facility] Lisa Hendrickson   Tel.  [phone]   Fax. [phone]       Subjective:   Boogie Luevano is a 47 y.o. female seen for a positive airway pressure follow-up. She was last seen by Dr. Ariela Santiago 1/2018 and reports that she feels better when she uses her machine. In the past she would not take it to travel and noticed she did not sleep as well. This spring to took the machine with her to [facility] and felt much better using while traveling. She reports no problems using the device. The following concerns reviewed:    Drowsiness no Problems exhaling no   Snoring no Forget to put on no   Mask Comfortable yes Can't fall asleep no   Dry Mouth no Mask falls off no   Air Leaking no Frequent awakenings no       She admits that her sleep has improved on PAP therapy using nasal mask and heated tubing. Review of device download indicated:  Auto pressure: 6-12 cmH2O; Average % Night in Large Leak: 0.0; % Used Days >= 4 hours: 83. Therapy Apnea Index averaged over PAP use: 3.5 /hr which reflects improved sleep breathing condition. [place] Sleepiness Score: 6 and Modified F.O.S.Q. Score Total / 2: 17.5 which reflects improved sleep quality over therapy time. No Known Allergies    She has a current medication list which includes the following prescription(s): metformin er, spironolactone, calcium carbonate, ferrous sulfate, ranitidine, cetirizine, and multivitamin. .      She  has a past medical history of GERD (gastroesophageal reflux disease) (11/24/2014), Obesity (11/24/2014), and PCOS (polycystic ovarian syndrome) (11/24/2014). Sleep Review of Systems: notable for Negative difficulty falling asleep;  Negative awakenings at night; Positive perceived regular dreaming; Negative nightmares; Negative early morning headaches; Negative memory problems; Negative concentration issues; Negative chest pain; Negative shortness of breath; Negative significant mood issues; 0 afternoon naps per week;  Positive caffeine;  Negative history of any automobile or occupational accidents due to daytime drowsiness. Objective:     Visit Vitals  /77 (BP 1 Location: Left arm)   Pulse 71   Ht 5' 3.9\" (1.623 m)   Wt 273 lb 12.8 oz (124.2 kg)   SpO2 98%   BMI 47.14 kg/m²            General:   Alert, oriented, not in acute distress   Eyes:  Anicteric Sclerae; no obvious strabismus   Nose:  No obvious nasal septum deviation    Oropharynx:   Mallampati score 4, thick tongue base, uvula not seen due to low-lying soft palate, narrow tonsilo-pharyngeal pilars, tongue scalloped   Neck:   Midline trachea   Chest/Lungs:  Symmetrical lung expansion, clear lung fields on auscultation    CVS:  Normal rate, regular rhythm,  no JVD   Extremities:  No obvious rashes, no edema    Neuro:  No focal deficits; No obvious tremor    Psych:  Normal affect,  normal countenance       Assessment:       ICD-10-CM ICD-9-CM    1. JESSY (obstructive sleep apnea) G47.33 327.23 AMB SUPPLY ORDER   2. PCOS (polycystic ovarian syndrome) E28.2 256.4    3. Adult BMI 45.0-49.9 kg/sq m (HCC) Z68.42 V85.42        AHI = 6(9/2017). On CPAP :  6-12 cmH2O. She is compliant with PAP therapy and PAP continues to benefit patient and remains necessary for control of her sleep apnea. Plan:     Follow-up and Dispositions    · Return in about 1 year (around 6/11/2020). * Continue on current pressures    * Supplies ordered - nasal mask and heated tubing      Orders Placed This Encounter    AMB SUPPLY ORDER     Diagnosis: (G47.33) JESSY (obstructive sleep apnea)  (primary encounter diagnosis)     Replacement Supplies for Positive Airway Pressure Therapy Device:   Duration of need: 99 months.       Nasal Cushion (Replace) 2 per month.   Nasal Interface Mask 1 every 3 months.  Headgear 1 every 6 months.  Tubing with heating element 1 every 3 months.  Filter(s) Disposable 2 per month.  Filter(s) Non-Disposable 1 every 6 months. .   [address] [facility] for Humidifier (Replace) 1 every 6 months. ANJELICA Escoto; NPI: [id number]    Electronically signed. Date:- 06/11/19       * Counseling was provided regarding the importance of regular PAP use with emphasis on ensuring sufficient total sleep time, proper sleep hygiene, and safe driving. * Re-enforced proper and regular cleaning for the device. * She was asked to contact our office for any problems regarding PAP therapy. 2. PCOS (polycystic ovarian syndrome) - continue on her current regimen. I have reviewed the relationship between PCOS and sleep disordered breathing. 3. Recommended a dedicated weight loss program through appropriate diet and exercise regimen as significant weight reduction has been shown to reduce severity of obstructive sleep apnea. Patient's phone number [phone] (home)  was reviewed and confirmed for accuracy. She gives permission for messages regarding results and appointments to be left at that number. ANJELICA Escoto, [address] [facility]  Electronically signed.  06/11/19

## 2019-06-11 NOTE — PATIENT INSTRUCTIONS
217 [facility] [place]. [place], [address]  Tel.  [phone]  Fax. 100 [facility] 60  [place], [address] [employer]  Tel.  [phone]  Fax. [phone] [address]  Tel.  [phone]  Fax. [phone]     Learning About CPAP for Sleep Apnea  What is CPAP? CPAP is a small machine that you use at home every night while you sleep. It increases air pressure in your throat to keep your airway open. When you have sleep apnea, this can help you sleep better so you feel much better. CPAP stands for \"continuous positive airway pressure. \"  The CPAP machine will have one of the following:  · A mask that covers your nose and mouth  · Prongs that fit into your nose  · A mask that covers your nose only, the most common type. This type is called NCPAP. The N stands for \"nasal.\"  Why is it done? CPAP is usually the best treatment for obstructive sleep apnea. It is the first treatment choice and the most widely used. Your doctor may suggest CPAP if you have:  · Moderate to severe sleep apnea. · Sleep apnea and coronary artery disease (CAD) or heart failure. How does it help? · CPAP can help you have more normal sleep, so you feel less sleepy and more alert during the daytime. · CPAP may help keep heart failure or other heart problems from getting worse. · NCPAP may help lower your blood pressure. · If you use CPAP, your bed partner may also sleep better because you are not snoring or restless. What are the side effects? Some people who use CPAP have:  · A dry or stuffy nose and a sore throat. · Irritated skin on the face. · Sore eyes. · Bloating. If you have any of these problems, work with your doctor to fix them. Here are some things you can try:  · Be sure the mask or nasal prongs fit well. · See if your doctor can adjust the pressure of your CPAP. · If your nose is dry, try a humidifier.   · If your nose is runny or stuffy, try decongestant medicine or a steroid nasal spray. If these things do not help, you might try a different type of machine. Some machines have air pressure that adjusts on its own. Others have air pressures that are different when you breathe in than when you breathe out. This may reduce discomfort caused by too much pressure in your nose. Where can you learn more? Go to Crowdlinker.be  Enter MemberPass in the search box to learn more about \"Learning About CPAP for Sleep Apnea. \"   © 4877-4166 Healthwise, Incorporated. Care instructions adapted under license by Namita Davey (which disclaims liability or warranty for this information). This care instruction is for use with your licensed healthcare professional. If you have questions about a medical condition or this instruction, always ask your healthcare professional. [address] any warranty or liability for your use of this information. Content Version: 9.1.63661; Last Revised: January 11, 2010  PROPER SLEEP HYGIENE    What to avoid  · Do not have drinks with caffeine, such as coffee or black tea, for 8 hours before bed. · Do not smoke or use other types of tobacco near bedtime. Nicotine is a stimulant and can keep you awake. · Avoid drinking alcohol late in the evening, because it can cause you to wake in the middle of the night. · Do not eat a big meal close to bedtime. If you are hungry, eat a light snack. · Do not drink a lot of water close to bedtime, because the need to urinate may wake you up during the night. · Do not read or watch TV in bed. Use the bed only for sleeping and sexual activity. What to try  · Go to bed at the same time every night, and wake up at the same time every morning. Do not take naps during the day. · Keep your bedroom quiet, dark, and cool. · Get regular exercise, but not within 3 to 4 hours of your bedtime. .  · Sleep on a comfortable pillow and mattress.   · If watching the clock makes you anxious, turn it facing away from you so you cannot see the time. · If you worry when you lie down, start a worry book. Well before bedtime, write down your worries, and then set the book and your concerns aside. · Try meditation or other relaxation techniques before you go to bed. · If you cannot fall asleep, get up and go to another room until you feel sleepy. Do something relaxing. Repeat your bedtime routine before you go to bed again. · Make your house quiet and calm about an hour before bedtime. Turn down the lights, turn off the TV, log off the computer, and turn down the volume on music. This can help you relax after a busy day. Drowsy Driving: The [facility] 54 cites drowsiness as a causing factor in more than 063,274 police reported crashes annually, resulting in 76,000 injuries and 1,500 deaths. Other surveys suggest 55% of people polled have driven while drowsy in the past year, 23% had fallen asleep but not crashed, 3% crashed, and 2% had and accident due to drowsy driving. Who is at risk? Young Drivers: One study of drowsy driving accidents states that 55% of the drivers were under 25 years. Of those, 75% were male. Shift Workers and Travelers: People who work overnight or travel across time zones frequently are at higher risk of experiencing Circadian Rhythm Disorders. They are trying to work and function when their body is programed to sleep. Sleep Deprived: Lack of sleep has a serious impact on your ability to pay attention or focus on a task. Consistently getting less than the average of 8 hours your body needs creates partial or cumulative sleep deprivation. Untreated Sleep Disorders: Sleep Apnea, Narcolepsy, R.L.S., and other sleep disorders (untreated) prevent a person from getting enough restful sleep. This leads to excessive daytime sleepiness and increases the risk for drowsy driving accidents by up to 7 times.   Medications / Alcohol: Even over the counter medications can cause drowsiness. Medications that impair a drivers attention should have a warning label. Alcohol naturally makes you sleepy and on its own can cause accidents. Combined with excessive drowsiness its effects are amplified. Signs of Drowsy Driving:   * You don't remember driving the last few miles   * You may drift out of your dagmar   * You are unable to focus and your thoughts wander   * You may yawn more often than normal   * You have difficulty keeping your eyes open / nodding off   * Missing traffic signs, speeding, or tailgating  Prevention-   Good sleep hygiene, lifestyle and behavioral choices have the most impact on drowsy driving. There is no substitute for sleep and the average person requires 8 hours nightly. If you find yourself driving drowsy, stop and sleep. Consider the sleep hygiene tips provided during your visit as well. Medication Refill Policy: Refills for all medications require 1 week advance notice. Please have your pharmacy fax a refill request. We are unable to fax, or call in \"controled substance\" medications and you will need to pick these prescriptions up from our office. Zentact Activation    Thank you for requesting access to [employer]ent[employer]. Please follow the instructions below to securely access and download your online medical record. Zentact allows you to send messages to your doctor, view your test results, renew your prescriptions, schedule appointments, and more. How Do I Sign Up? 1. In your internet browser, go to [email or web address]. 2. Click on the First Time User? Click Here link in the Sign In box. You will see the New Member Sign Up page. 3. Enter your Zentact Access Code exactly as it appears below. You will not need to use this code after youve completed the sign-up process. If you do not sign up before the expiration date, you must request a new code. Zentact Access Code:  Activation code not generated  Current Torqeedo Status: Active (This is the date your Torqeedo access code will )    4. Enter the last four digits of your Social Security Number (xxxx) and Date of Birth (mm/dd/yyyy) as indicated and click Submit. You will be taken to the next sign-up page. 5. Create a Alliquat ID. This will be your Torqeedo login ID and cannot be changed, so think of one that is secure and easy to remember. 6. Create a Torqeedo password. You can change your password at any time. 7. Enter your Password Reset Question and Answer. This can be used at a later time if you forget your password. 8. Enter your e-mail address. You will receive e-mail notification when new information is available in [address] 9. Click Sign Up. You can now view and download portions of your medical record. 10. Click the Download Summary menu link to download a portable copy of your medical information. Additional Information    If you have questions, please call [phone]. Remember, Torqeedo is NOT to be used for urgent needs. For medical emergencies, dial 911.

## 2019-06-11 NOTE — PROGRESS NOTES
Download reviewed and case discussed with NP  Agree with plan  Note reviewed    Electronically signed by    Myron Brady MD  Diplomate in Sleep Medicine  [facility]

## 2019-06-14 ENCOUNTER — OFFICE VISIT (OUTPATIENT)
Dept: INTERNAL MEDICINE CLINIC | Age: 49
End: 2019-06-14

## 2019-06-14 VITALS
OXYGEN SATURATION: 97 % | BODY MASS INDEX: 46.84 KG/M2 | HEART RATE: 72 BPM | SYSTOLIC BLOOD PRESSURE: 128 MMHG | HEIGHT: 64 IN | TEMPERATURE: 98.5 F | DIASTOLIC BLOOD PRESSURE: 86 MMHG | RESPIRATION RATE: 18 BRPM | WEIGHT: 274.38 LBS

## 2019-06-14 DIAGNOSIS — M54.50 CHRONIC LEFT-SIDED LOW BACK PAIN WITHOUT SCIATICA: ICD-10-CM

## 2019-06-14 DIAGNOSIS — E28.2 PCOS (POLYCYSTIC OVARIAN SYNDROME): ICD-10-CM

## 2019-06-14 DIAGNOSIS — G89.29 CHRONIC LEFT-SIDED LOW BACK PAIN WITHOUT SCIATICA: ICD-10-CM

## 2019-06-14 DIAGNOSIS — E66.01 OBESITY, MORBID (HCC): ICD-10-CM

## 2019-06-14 DIAGNOSIS — Z12.11 COLON CANCER SCREENING: ICD-10-CM

## 2019-06-14 DIAGNOSIS — K21.9 GASTROESOPHAGEAL REFLUX DISEASE, ESOPHAGITIS PRESENCE NOT SPECIFIED: Primary | ICD-10-CM

## 2019-06-14 DIAGNOSIS — G47.33 OSA (OBSTRUCTIVE SLEEP APNEA): ICD-10-CM

## 2019-06-14 RX ORDER — VITAMIN E 1000 UNIT
CAPSULE ORAL
COMMUNITY
End: 2022-08-04

## 2019-06-14 RX ORDER — LEVOCETIRIZINE DIHYDROCHLORIDE 5 MG/1
TABLET, FILM COATED ORAL
COMMUNITY
End: 2022-08-04

## 2019-06-14 NOTE — PATIENT INSTRUCTIONS
It was a pleasure to see you! As discussed: It has been a pleasure caring for you! For your next appointment:   Schedule with   Dr. Stephan Riley  [phone]    Ludmila Guzman MD  Address: [place], [address]   Phone: [phone]       Back Pain: Care Instructions  Your Care Instructions  Back pain has many possible causes. It is often related to problems with muscles and ligaments of the back. It may also be related to problems with the nerves, discs, or bones of the back. Moving, lifting, standing, sitting, or sleeping in an awkward way can strain the back. Sometimes you don't notice the injury until later. Arthritis is another common cause of back pain. Although it may hurt a lot, back pain usually improves on its own within several weeks. Most people recover in 12 weeks or less. Using good home treatment and being careful not to stress your back can help you feel better sooner. Follow-up care is a key part of your treatment and safety. Be sure to make and go to all appointments, and call your doctor if you are having problems. It's also a good idea to know your test results and keep a list of the medicines you take. How can you care for yourself at home? · Sit or lie in positions that are most comfortable and reduce your pain. Try one of these positions when you lie down:  ? Lie on your back with your knees bent and supported by large pillows. ? Lie on the floor with your legs on the seat of a sofa or chair. ? Lie on your side with your knees and hips bent and a pillow between your legs. ? Lie on your stomach if it does not make pain worse. · Do not sit up in bed, and avoid soft couches and twisted positions. Bed rest can help relieve pain at first, but it delays healing. Avoid bed rest after the first day of back pain. · Change positions every 30 minutes. If you must sit for long periods of time, take breaks from sitting.  Get up and walk around, or lie in a comfortable position. · Try using a heating pad on a low or medium setting for 15 to 20 minutes every 2 or 3 hours. Try a warm shower in place of one session with the heating pad. · You can also try an ice pack for 10 to 15 minutes every 2 to 3 hours. Put a thin cloth between the ice pack and your skin. · Take pain medicines exactly as directed. ? If the doctor gave you a prescription medicine for pain, take it as prescribed. ? If you are not taking a prescription pain medicine, ask your doctor if you can take an over-the-counter medicine. · Take short walks several times a day. You can start with 5 to 10 minutes, 3 or 4 times a day, and work up to longer walks. Walk on level surfaces and avoid hills and stairs until your back is better. · Return to work and other activities as soon as you can. Continued rest without activity is usually not good for your back. · To prevent future back pain, do exercises to stretch and strengthen your back and stomach. Learn how to use good posture, safe lifting techniques, and proper body mechanics. When should you call for help? Call your doctor now or seek immediate medical care if:    · You have new or worsening numbness in your legs.     · You have new or worsening weakness in your legs. (This could make it hard to stand up.)     · You lose control of your bladder or bowels.    Watch closely for changes in your health, and be sure to contact your doctor if:    · You have a fever, lose weight, or don't feel well.     · You do not get better as expected. Where can you learn more? Go to http://henrietta-dwight.info/. Enter R552 in the search box to learn more about \"Back Pain: Care Instructions. \"  Current as of: September 20, 2018  Content Version: 11.9  © 9131-2799 [employer]. Care instructions adapted under license by [employer] (which disclaims liability or warranty for this information).  If you have questions about a medical condition or this instruction, always ask your healthcare professional. [place] [address] any warranty or liability for your use of this information.

## 2019-06-14 NOTE — PROGRESS NOTES
HISTORY OF PRESENT ILLNESS  Sher Mauro is a 47 y.o. female. HPI  JESSY   Followed by Sleep Medicine. She has been using her CPAP. No complications. Left Back Pain    Has completed PT. It hurts after she exercises. This has limited her mobility. Also having low back pain. She also saw a Chiropractor. Cardiovascular Review:  The patient has obesity and PCOS. Diet and Lifestyle: exercises regularly, nonsmoker  Home BP Monitoring: is not measured at home. Pertinent ROS: taking medications as instructed, no medication side effects noted, no TIA's, no chest pain on exertion, no dyspnea on exertion, no swelling of ankles. ROSper HPI   Patient Active Problem List    Diagnosis Date Noted    Obesity, morbid ([facility] [address] 01/10/2018    JESSY (obstructive sleep apnea) 09/29/2017    Obesity 11/24/2014    GERD (gastroesophageal reflux disease) 11/24/2014    PCOS (polycystic ovarian syndrome) 11/24/2014       Current Outpatient Medications   Medication Sig Dispense Refill    ascorbic acid, vitamin C, (VITAMIN C) 1,000 mg tablet Take  by mouth.  levocetirizine (XYZAL) 5 mg tablet Take  by mouth.  metFORMIN ER (GLUCOPHAGE XR) 500 mg tablet TAKE 2 TABS BY MOUTH DAILY (WITH DINNER). 180 Tab 1    spironolactone (ALDACTONE) 50 mg tablet Take 1 Tab by mouth daily. 30 Tab 5    calcium carbonate (CALCIUM 600 PO) Take 1 Tab by mouth daily.  ferrous sulfate (SLOW FE) 142 mg (45 mg iron) ER tablet Take 1 Tab by mouth Daily (before breakfast). 90 Tab 1    raNITIdine (ZANTAC) 150 mg tablet Take 150 mg by mouth nightly.  MULTIVITAMIN PO Take 2 Tabs by mouth daily.  cetirizine (ZYRTEC) 10 mg tablet Take 1 Tab by mouth daily.  30 Tab 1       No Known Allergies   Visit Vitals  /86 (BP 1 Location: Right arm, BP Patient Position: Sitting)   Pulse 72   Temp 98.5 °F (36.9 °C) (Oral)   Resp 18   Ht 5' 3.9\" (1.623 m)   Wt 274 lb 6 oz (124.5 kg)   SpO2 97%   BMI 47.24 kg/m²       Physical Exam Constitutional: She is oriented to person, place, and time. She appears well-developed. No distress. Eyes: Conjunctivae are normal.   Cardiovascular: Normal rate, regular rhythm and normal heart sounds. Pulmonary/Chest: Effort normal and breath sounds normal. No respiratory distress. She has no wheezes. She has no rales. She exhibits no tenderness. Musculoskeletal: She exhibits no edema (BLE). Neurological: She is alert and oriented to person, place, and time. Skin: Skin is warm. Psychiatric: She has a normal mood and affect. ASSESSMENT and PLAN  Diagnoses and all orders for this visit:    1. Gastroesophageal reflux disease, esophagitis presence not specified- symptoms stable with dietary changes. Continue Zantac    2. Obesity, morbid ([address]- contemplative about weight loss. She will continue to consider returning to nutritionist.  For now she will work on optimizing her food on her own. We discussed the correlation between her increased weight in her low back pain she is aware of the connection and the need to make changes    3. JESSY (obstructive sleep apnea)- followed by sleep medicine. 4. PCOS (polycystic ovarian syndrome)-on metformin. Working on weight loss. 5. Colon cancer screening-due for colon cancer screening. We discussed the different methodologies available. She would like to pursue a colonoscopy. Referral given  -     REFERRAL TO GASTROENTEROLOGY    6. Chronic left-sided low back pain without sciatica-no red flag symptoms. She has had previous imaging and been to physical therapy she would benefit from seeing orthopedics for additional treatment options.  -     REFERRAL TO ORTHOPEDICS      Follow-up and Dispositions    · Return for Establish with new primary care. Medication risks/benefits/costs/interactions/alternatives discussed with patient. Alejandro Bonnerfatimah  was given an after visit summary which includes diagnoses, current medications, & vitals.   she expressed understanding with the diagnosis and plan.

## 2019-06-27 LAB
25(OH)D3+25(OH)D2 SERPL-MCNC: 24.5 NG/ML (ref 30–100)
ALBUMIN SERPL-MCNC: 4.5 G/DL (ref 3.5–5.5)
ALBUMIN/GLOB SERPL: 1.6 {RATIO} (ref 1.2–2.2)
ALP SERPL-CCNC: 70 IU/L (ref 39–117)
ALT SERPL-CCNC: 19 IU/L (ref 0–32)
AST SERPL-CCNC: 20 IU/L (ref 0–40)
BILIRUB SERPL-MCNC: 0.5 MG/DL (ref 0–1.2)
BUN SERPL-MCNC: 12 MG/DL (ref 6–24)
BUN/CREAT SERPL: 13 (ref 9–23)
CALCIUM SERPL-MCNC: 10.3 MG/DL (ref 8.7–10.2)
CHLORIDE SERPL-SCNC: 103 MMOL/L (ref 96–106)
CHOLEST SERPL-MCNC: 143 MG/DL (ref 100–199)
CO2 SERPL-SCNC: 25 MMOL/L (ref 20–29)
CREAT SERPL-MCNC: 0.91 MG/DL (ref 0.57–1)
ERYTHROCYTE [DISTWIDTH] IN BLOOD BY AUTOMATED COUNT: 13.5 % (ref 12.3–15.4)
GLOBULIN SER CALC-MCNC: 2.8 G/DL (ref 1.5–4.5)
GLUCOSE SERPL-MCNC: 84 MG/DL (ref 65–99)
HCT VFR BLD AUTO: 37.3 % (ref 34–46.6)
HDLC SERPL-MCNC: 42 MG/DL
HGB BLD-MCNC: 11.5 G/DL (ref 11.1–15.9)
LDLC SERPL CALC-MCNC: 88 MG/DL (ref 0–99)
MCH RBC QN AUTO: 24.8 PG (ref 26.6–33)
MCHC RBC AUTO-ENTMCNC: 30.8 G/DL (ref 31.5–35.7)
MCV RBC AUTO: 81 FL (ref 79–97)
PLATELET # BLD AUTO: 258 X10E3/UL (ref 150–450)
POTASSIUM SERPL-SCNC: 4.5 MMOL/L (ref 3.5–5.2)
PROT SERPL-MCNC: 7.3 G/DL (ref 6–8.5)
RBC # BLD AUTO: 4.63 X10E6/UL (ref 3.77–5.28)
SODIUM SERPL-SCNC: 140 MMOL/L (ref 134–144)
T4 FREE SERPL-MCNC: 0.93 NG/DL (ref 0.82–1.77)
TRIGL SERPL-MCNC: 66 MG/DL (ref 0–149)
TSH SERPL DL<=0.005 MIU/L-ACNC: 2.16 UIU/ML (ref 0.45–4.5)
VLDLC SERPL CALC-MCNC: 13 MG/DL (ref 5–40)
WBC # BLD AUTO: 5.9 X10E3/UL (ref 3.4–10.8)

## 2019-06-28 NOTE — PROGRESS NOTES
Send nl letter  Vitamin d is slightly low. Start vitamin d 1000 units every day, available over the counter.

## 2019-08-13 ENCOUNTER — OFFICE VISIT (OUTPATIENT)
Dept: PRIMARY CARE CLINIC | Age: 49
End: 2019-08-13

## 2019-08-13 VITALS
HEART RATE: 69 BPM | HEIGHT: 64 IN | RESPIRATION RATE: 18 BRPM | TEMPERATURE: 98.3 F | WEIGHT: 279 LBS | BODY MASS INDEX: 47.63 KG/M2 | OXYGEN SATURATION: 98 % | SYSTOLIC BLOOD PRESSURE: 120 MMHG | DIASTOLIC BLOOD PRESSURE: 88 MMHG

## 2019-08-13 DIAGNOSIS — E28.2 PCOS (POLYCYSTIC OVARIAN SYNDROME): Primary | ICD-10-CM

## 2019-08-13 DIAGNOSIS — E66.01 OBESITY, MORBID (HCC): ICD-10-CM

## 2019-08-13 DIAGNOSIS — K21.9 GASTROESOPHAGEAL REFLUX DISEASE WITHOUT ESOPHAGITIS: ICD-10-CM

## 2019-08-13 DIAGNOSIS — G47.33 OSA (OBSTRUCTIVE SLEEP APNEA): ICD-10-CM

## 2019-08-13 NOTE — PROGRESS NOTES
HISTORY OF PRESENT ILLNESS  Huber Kang is a 47 y.o. female. HPI  The patient presents today to [facility] care. Previously under the care of Dr. Harpreet Camarillo (). Records in [facility] review. Pt is accompanied by her daughter. Labs were drawn in 06/26/19 and wants to discuss with me. CBC showed MCH is low at 24.8 and MCHC is low at 30.8, but otherwise normal. Vitamin D was low at 24.5. CMP was elevated at 10.3, but otherwise normal Lipid panel, T4 and TSH were normal.    Pt is taking spironolactone 50 mg and metformin  mg for PCOS. Pt still has a menstrual cycle. She denies diarrhea. Pt is compliant with CPAP. She started using it about 1 year ago. She noticed that there is a difference, but especially when she forgot it in [place]. She initially had used it for the minimun of 4 hours a night, when she would get up to urinate and then took it off. She has since continued to use it through out the night. Pt reported fatigue, and had issues trying to figure out if the tiredness was due to PCOS. Pt reports that she has been exercising recently. She was not given medications to help her with the weight loss. She reports that she has tried to use a modified keto diet, and lost 10 lbs, but otherwise, has maintained her weight. Pt reports having heartburn, which is well managed with OTC Zantac. She take iron tablets, as she was told that she was anemic. Pt receives her flu shots annually. Patient Active Problem List   Diagnosis Code    GERD (gastroesophageal reflux disease) K21.9    PCOS (polycystic ovarian syndrome) E28.2    JESSY (obstructive sleep apnea) G47.33    Obesity, morbid (HCC) E66.01        Current Outpatient Medications on File Prior to Visit   Medication Sig Dispense Refill    spironolactone (ALDACTONE) 50 mg tablet TAKE 1 TABLET BY MOUTH EVERY DAY 90 Tab 1    ascorbic acid, vitamin C, (VITAMIN C) 1,000 mg tablet Take  by mouth.       metFORMIN ER (GLUCOPHAGE XR) 500 mg tablet TAKE 2 TABS BY MOUTH DAILY (WITH DINNER). 180 Tab 1    calcium carbonate (CALCIUM 600 PO) Take 1 Tab by mouth daily.  ferrous sulfate (SLOW FE) 142 mg (45 mg iron) ER tablet Take 1 Tab by mouth Daily (before breakfast). 90 Tab 1    raNITIdine (ZANTAC) 150 mg tablet Take 150 mg by mouth nightly.  cetirizine (ZYRTEC) 10 mg tablet Take 1 Tab by mouth daily. 30 Tab 1    MULTIVITAMIN PO Take 2 Tabs by mouth daily.  levocetirizine (XYZAL) 5 mg tablet Take  by mouth. No current facility-administered medications on file prior to visit.         Past Medical History:   Diagnosis Date    GERD (gastroesophageal reflux disease) 11/24/2014    Obesity 11/24/2014    PCOS (polycystic ovarian syndrome) 11/24/2014         Family History   Problem Relation Age of Onset    Cancer Mother         clear cell    Hypertension Mother     Hypertension Father        Social History     Socioeconomic History    Marital status: SINGLE     Spouse name: Not on file    Number of children: Not on file    Years of education: Not on file    Highest education level: Not on file   Occupational History    Not on file   Social Needs    Financial resource strain: Not on file    Food insecurity:     Worry: Not on file     Inability: Not on file    Transportation needs:     Medical: Not on file     Non-medical: Not on file   Tobacco Use    Smoking status: Never Smoker    Smokeless tobacco: Never Used   Substance and Sexual Activity    Alcohol use: No    Drug use: No    Sexual activity: Never   Lifestyle    Physical activity:     Days per week: Not on file     Minutes per session: Not on file    Stress: Not on file   Relationships    Social connections:     Talks on phone: Not on file     Gets together: Not on file     Attends Restoration service: Not on file     Active member of club or organization: Not on file     Attends meetings of clubs or organizations: Not on file     Relationship status: Not on file    Intimate partner violence:     Fear of current or ex partner: Not on file     Emotionally abused: Not on file     Physically abused: Not on file     Forced sexual activity: Not on file   Other Topics Concern    Not on file   Social History Narrative    Not on file         Review of Systems   Constitutional: Positive for malaise/fatigue. Eyes: Negative for blurred vision. Respiratory: Negative for shortness of breath. Cardiovascular: Negative for chest pain and leg swelling. Gastrointestinal: Positive for heartburn (managed with Zantac). Negative for diarrhea. Neurological: Negative for dizziness and headaches. Psychiatric/Behavioral: Negative for depression and substance abuse. The patient is not nervous/anxious. Visit Vitals  /88 (BP 1 Location: Right arm, BP Patient Position: Sitting)   Pulse 69   Temp 98.3 °F (36.8 °C) (Oral)   Resp 18   Ht 5' 4\" (1.626 m)   Wt 279 lb (126.6 kg)   LMP 08/03/2019   SpO2 98%   BMI 47.89 kg/m²       Physical Exam   Constitutional: She is oriented to person, place, and time. She appears well-developed. No distress. Morbidly obese   HENT:   Head: Normocephalic and atraumatic. Neck: Normal range of motion. Neck supple. Cardiovascular: Normal rate, regular rhythm, normal heart sounds and intact distal pulses. Exam reveals no gallop and no friction rub. No murmur heard. Pulmonary/Chest: Effort normal and breath sounds normal. No respiratory distress. She has no wheezes. She has no rales. She exhibits no tenderness. Abdominal: Soft. Bowel sounds are normal. She exhibits no distension. There is no tenderness. Musculoskeletal: Normal range of motion. She exhibits no edema. Neurological: She is alert and oriented to person, place, and time. Skin: She is not diaphoretic. Psychiatric: She has a normal mood and affect. Her behavior is normal. Judgment and thought content normal.   Nursing note and vitals reviewed.         ASSESSMENT and PLAN    ICD-10-CM ICD-9-CM    1. PCOS (polycystic ovarian syndrome) E28.2 256.4 On Metformin and Spironolactone. 2. JESSY (obstructive sleep apnea) G47.33 327.23 Well managed with CPAP. 3. Obesity, morbid ([address] E66.01 278.01 Advised pt to purchase a FitBit or similar device. Discussed that a healthy lifestyle requires 5,000-7,000 steps daily, but weight loss requires 10,000 steps daily. I recommend the patient download the Weight Watchers cherelle to help track food consumption. The patient should not use their weekly points, as weekly points are for weight maintenance and the patient is trying to lose weight. The patient should eat plenty of fruits and vegetables. Encouraged the patient to exercise. Patient should take OTC 1,000 iu vitamin D3 2-3 times a week. Discussed that if she forgets to take it, she can take 2-3 doses at once. 4. Gastroesophageal reflux disease without esophagitis K21.9 530.81 Well managed with Zantac. This plan was reviewed with the patient and patient agrees. All questions were answered. This scribe documentation was reviewed by me and accurately reflects the examination and decisions made by me. This note will not be viewable in [address]

## 2019-08-13 NOTE — PROGRESS NOTES
Visit Vitals  /88 (BP 1 Location: Right arm, BP Patient Position: Sitting)   Pulse 69   Temp 98.3 °F (36.8 °C) (Oral)   Resp 18   Ht 5' 4\" (1.626 m)   Wt 279 lb (126.6 kg)   SpO2 98%   BMI 47.89 kg/m²     Chief Complaint   Patient presents with   [address]     Patient needs a new PCP; Previously a patient of Dr. Ramy Phillip           HM Due Reviewed and WNL. Patient is not interested in [address], patient is new to practice, previously a patient of Dr. Ramy Phillip. Interested in Weight Management. 1. Have you been to the ER, urgent care clinic since your last visit? Hospitalized since your last visit? Denies     2. Have you seen or consulted any other health care providers outside of the [address] [place] [address] since your last visit? Include any pap smears or colon screening.  Denies

## 2019-08-28 DIAGNOSIS — E28.2 PCOS (POLYCYSTIC OVARIAN SYNDROME): ICD-10-CM

## 2019-08-28 RX ORDER — METFORMIN HYDROCHLORIDE 500 MG/1
TABLET, EXTENDED RELEASE ORAL
Qty: 180 TAB | Refills: 1 | OUTPATIENT
Start: 2019-08-28

## 2019-09-20 ENCOUNTER — TELEPHONE (OUTPATIENT)
Dept: PRIMARY CARE CLINIC | Age: 49
End: 2019-09-20

## 2019-09-23 DIAGNOSIS — E28.2 PCOS (POLYCYSTIC OVARIAN SYNDROME): ICD-10-CM

## 2019-09-25 RX ORDER — METFORMIN HYDROCHLORIDE 500 MG/1
TABLET, EXTENDED RELEASE ORAL
Qty: 180 TAB | Refills: 1 | OUTPATIENT
Start: 2019-09-25

## 2019-10-03 DIAGNOSIS — E28.2 PCOS (POLYCYSTIC OVARIAN SYNDROME): ICD-10-CM

## 2019-10-03 RX ORDER — METFORMIN HYDROCHLORIDE 500 MG/1
TABLET, EXTENDED RELEASE ORAL
Qty: 180 TAB | Refills: 1 | Status: SHIPPED | OUTPATIENT
Start: 2019-10-03 | End: 2022-08-04 | Stop reason: SDUPTHER

## 2020-06-16 ENCOUNTER — VIRTUAL VISIT (OUTPATIENT)
Dept: SLEEP MEDICINE | Age: 50
End: 2020-06-16

## 2020-06-16 ENCOUNTER — DOCUMENTATION ONLY (OUTPATIENT)
Dept: SLEEP MEDICINE | Age: 50
End: 2020-06-16

## 2020-06-16 DIAGNOSIS — G47.33 OSA (OBSTRUCTIVE SLEEP APNEA): Primary | ICD-10-CM

## 2020-06-16 DIAGNOSIS — E28.2 PCOS (POLYCYSTIC OVARIAN SYNDROME): ICD-10-CM

## 2020-06-16 RX ORDER — LIRAGLUTIDE 6 MG/ML
3 INJECTION, SOLUTION SUBCUTANEOUS
COMMUNITY
End: 2022-08-04

## 2020-06-16 NOTE — PROGRESS NOTES
[facility] [place]. [place], [address]   Tel.  [phone]   Fax. [facility]   [place], [address] [facility]   Tel.  [phone]   Fax. [phone] [address]   Tel.  [phone]   Fax. [phone]       Subjective:   Filiberto Collazo is a 45 y.o. female who was seen by synchronous (real-time) audio-video technology on 6/16/2020. Consent:  She and/or her healthcare decision maker is aware that this patient-initiated Telehealth encounter is a billable service, with coverage as determined by her insurance carrier. She is aware that she may receive a bill and has provided verbal consent to proceed: Yes    I was at home while conducting this encounter. Patient verified with 's License. Filiberto Collazo is seen for a positive airway pressure follow-up, last seen by me on 6/11/2019, previously seen by Dr. Roque Dwyer 1/10/2018, prior notes reviewed in detail. Home sleep test 9/2017 showed AHI of 6.1/hr with a lowest SaO2 of 88%, duration of SaO2 < 88% 0 min. She  is seen today for follow up on device set up 12/5/2017. She reports problems using the device with taking it off in her sleep. The following concerns reviewed:    Drowsiness no Problems exhaling no   Snoring no Forget to put on no   Mask Comfortable no Can't fall asleep no   Dry Mouth yes Mask falls off no   Air Leaking yes Frequent awakenings yes       She admits that her sleep has improved on PAP therapy using nasal mask and hetaed tubing. She notes sometimes opening her mouth and would like to try a full face under the nose mask - dreamwear full face. She notes she feels better when she uses her device. Review of device download indicated:  Auto pressure: 6-12 cmH2O; Peak Avg Pressure: 8.4 cmH2O;  Avg. Device Pressure <= 90 %: 8.2 cmH2O    Average % Night in Large Leak:  0.3  % Used Days >= 4 hours: 49. Avg hours used:  5:21.     Therapy Apnea Index averaged over PAP use: 6.4 /hr which reflects improved sleep breathing condition. [place] Sleepiness Score: 2 and Modified F.O.S.Q. Score Total / 2: 20 which reflects improved sleep quality over therapy time. No Known Allergies    She has a current medication list which includes the following prescription(s): saxenda, metformin er, spironolactone, ascorbic acid (vitamin c), levocetirizine, calcium carbonate, ferrous sulfate, ranitidine, cetirizine, and multivitamin. .      She  has a past medical history of GERD (gastroesophageal reflux disease) (11/24/2014), Obesity (11/24/2014), and PCOS (polycystic ovarian syndrome) (11/24/2014). Sleep Review of Systems: notable for Negative difficulty falling asleep; Positive awakenings at night; Negative early morning headaches; Negative memory problems; Negative concentration issues; Negative chest pain; Negative shortness of breath; Negative significant joint pain at night; Negative significant muscle pain at night; Negative rashes or itching; Negative heartburn; Negative significant mood issues; 0 afternoon naps per week    Objective:   Vitals reported by patient     Patient-Reported Vitals 6/16/2020   Patient-Reported Weight 280 lb   Patient-Reported Height 5' 4\"   Calculate BMI 47     Physical Exam completed by visual and auditory observation of patient with verbal input from patient. General:   Alert, oriented, not in acute distress   Eyes:  Anicteric Sclerae; no obvious strabismus   Nose:  No obvious nasal septum deviation    Neck:   Midline trachea, no visible mass   Chest/Lungs:  Respiratory effort normal, no visualized signs of difficulty breathing or respiratory distress   CVS:  No JVD   Extremities:  No obvious rashes noted on face, neck, or hands   Neuro:  No facial asymmetry, no focal deficits; no obvious tremor    Psych:  Normal affect,  normal countenance       Assessment:       ICD-10-CM ICD-9-CM    1.  JESSY (obstructive sleep apnea) G47.33 327.23 AMB SUPPLY ORDER      AMB SUPPLY ORDER      AMB SUPPLY ORDER   2. PCOS (polycystic ovarian syndrome) E28.2 256.4    3. Adult BMI 45.0-49.9 kg/sq m (HCC) Z68.42 V85.42        AHI = 6.1(9/2017). On APAP :  6-12 cmH2O. She is not compliant with PAP therapy although when used PAP shows benefit to the patient and remains necessary for control of her sleep apnea. There is continued clinical benefit from the hours of use demonstrated by improved daytime sleepiness. Plan:     Follow-up and Dispositions    · Return in about 1 year (around 6/16/2021). * Continue on current pressures    * Supplies ordered - full facee mask and heated tubing. Orders for all mask types written so patient can choose. Orders Placed This Encounter    AMB SUPPLY ORDER     Diagnosis: (G47.33) JESSY (obstructive sleep apnea)  (primary encounter diagnosis)     Replacement Supplies for Positive Airway Pressure Therapy Device:   Duration of need: 99 months.  Full Face Mask 1 every 3 months.  Full Face Mask Cushion 1 per month.  Headgear 1 every 6 months.  Tubing with heating element 1 every 3 months.  Filter(s) Disposable 2 per month.  Filter(s) Non-Disposable 1 every 6 months. .   [facility] for Humidifier (Replace) 1 every 6 months. ANJELICA Aviles; NPI: [id number]    Electronically signed. Date:- 06/16/20    AMB SUPPLY ORDER     Diagnosis: (G47.33) JESSY (obstructive sleep apnea)  (primary encounter diagnosis)     Replacement Supplies for Positive Airway Pressure Therapy Device:   Duration of need: 99 months.  Nasal Cushion (Replace) 2 per month.  Nasal Interface Mask 1 every 3 months.  Pos Airway pressure chin strap   Headgear 1 every 6 months.  Tubing with heating element 1 every 3 months.  Filter(s) Disposable 2 per month.  Filter(s) Non-Disposable 1 every 6 months.    .   [address] [facility] for [employer] (Replace) 1 every 6 months. ANJELICA Bello; NPI: [id number]    Electronically signed. Date:- 06/16/20    AMB SUPPLY ORDER     Diagnosis: (G47.33) JESSY (obstructive sleep apnea)  (primary encounter diagnosis)     Replacement Supplies for Positive Airway Pressure Therapy Device:   Duration of need: 99 months.  Nasal Pillows (Replace) 2 per month.  Nasal Interface Mask 1 every 3 months.  Pos Airway pressure chin strap   Headgear 1 every 6 months.  Tubing with heating element 1 every 3 months.  Filter(s) Disposable 2 per month.  Filter(s) Non-Disposable 1 every 6 months. .   [address] [facility] for Humidifier (Replace) 1 every 6 months. ANJELICA Bello; NPI: [id number]    Electronically signed. Date:- 06/16/20       * Counseling was provided regarding the importance of regular PAP use with emphasis on ensuring sufficient total sleep time, proper sleep hygiene, and safe driving. * Re-enforced proper and regular cleaning for the device. * She was asked to contact our office for any problems regarding PAP therapy. 2. PCOS -  she continues on her current regimen. 3. Recommended a dedicated weight loss program through appropriate diet and exercise regimen as significant weight reduction has been shown to reduce severity of obstructive sleep apnea. Patient's phone number [phone] (home)  was reviewed and confirmed for accuracy. She gives permission for messages regarding results and appointments to be left at that number. Pursuant to the emergency declaration under the [facility], [facility], [facility] waiver authority and the [employer] and [employer] Act, this Virtual Visit was conducted, with patient's consent, to reduce the patient's risk of exposure to COVID-19 and provide continuity of care for an established patient.      Services were provided through a video synchronous discussion virtually to substitute for in-person clinic visit. ANJELICA Connor, [facility]  Electronically signed.  06/16/20

## 2020-06-16 NOTE — PATIENT INSTRUCTIONS
[address] [facility] Steve. [place], [address]  Tel.  [phone]  Fax. [address] [facility]  [place], [address] [facility]  Tel.  [phone]  Fax. [phone] [address]  Tel.  [phone]  Fax. [phone]     Learning About CPAP for Sleep Apnea  What is CPAP? CPAP is a small machine that you use at home every night while you sleep. It increases air pressure in your throat to keep your airway open. When you have sleep apnea, this can help you sleep better so you feel much better. CPAP stands for \"continuous positive airway pressure. \"  The CPAP machine will have one of the following:  · A mask that covers your nose and mouth  · Prongs that fit into your nose  · A mask that covers your nose only, the most common type. This type is called NCPAP. The N stands for \"nasal.\"  Why is it done? CPAP is usually the best treatment for obstructive sleep apnea. It is the first treatment choice and the most widely used. Your doctor may suggest CPAP if you have:  · Moderate to severe sleep apnea. · Sleep apnea and coronary artery disease (CAD) or heart failure. How does it help? · CPAP can help you have more normal sleep, so you feel less sleepy and more alert during the daytime. · CPAP may help keep heart failure or other heart problems from getting worse. · NCPAP may help lower your blood pressure. · If you use CPAP, your bed partner may also sleep better because you are not snoring or restless. What are the side effects? Some people who use CPAP have:  · A dry or stuffy nose and a sore throat. · Irritated skin on the face. · Sore eyes. · Bloating. If you have any of these problems, work with your doctor to fix them. Here are some things you can try:  · Be sure the mask or nasal prongs fit well. · See if your doctor can adjust the pressure of your CPAP. · If your nose is dry, try a humidifier.   · If your nose is runny or stuffy, try decongestant medicine or a steroid nasal spray. If these things do not help, you might try a different type of machine. Some machines have air pressure that adjusts on its own. Others have air pressures that are different when you breathe in than when you breathe out. This may reduce discomfort caused by too much pressure in your nose. Where can you learn more? Go to [employer]verlog[employer].be  Enter Muarisio Finch in the search box to learn more about \"Learning About CPAP for Sleep Apnea. \"   © 1827-9998 Healthwise, Incorporated. Care instructions adapted under license by [employer] (which disclaims liability or warranty for this information). This care instruction is for use with your licensed healthcare professional. If you have questions about a medical condition or this instruction, always ask your healthcare professional. [address] any warranty or liability for your use of this information. Content Version: 1.8.12544; Last Revised: January 11, 2010  PROPER SLEEP HYGIENE    What to avoid  · Do not have drinks with caffeine, such as coffee or black tea, for 8 hours before bed. · Do not smoke or use other types of tobacco near bedtime. Nicotine is a stimulant and can keep you awake. · Avoid drinking alcohol late in the evening, because it can cause you to wake in the middle of the night. · Do not eat a big meal close to bedtime. If you are hungry, eat a light snack. · Do not drink a lot of water close to bedtime, because the need to urinate may wake you up during the night. · Do not read or watch TV in bed. Use the bed only for sleeping and sexual activity. What to try  · Go to bed at the same time every night, and wake up at the same time every morning. Do not take naps during the day. · Keep your bedroom quiet, dark, and cool. · Get regular exercise, but not within 3 to 4 hours of your bedtime. .  · Sleep on a comfortable pillow and mattress.   · If watching the clock makes you anxious, turn it facing away from you so you cannot see the time. · If you worry when you lie down, start a worry book. Well before bedtime, write down your worries, and then set the book and your concerns aside. · Try meditation or other relaxation techniques before you go to bed. · If you cannot fall asleep, get up and go to another room until you feel sleepy. Do something relaxing. Repeat your bedtime routine before you go to bed again. · Make your house quiet and calm about an hour before bedtime. Turn down the lights, turn off the TV, log off the computer, and turn down the volume on music. This can help you relax after a busy day. Drowsy Driving: The [facility] 54 cites drowsiness as a causing factor in more than 806,303 police reported crashes annually, resulting in 76,000 injuries and 1,500 deaths. Other surveys suggest 55% of people polled have driven while drowsy in the past year, 23% had fallen asleep but not crashed, 3% crashed, and 2% had and accident due to drowsy driving. Who is at risk? Young Drivers: One study of drowsy driving accidents states that 55% of the drivers were under 25 years. Of those, 75% were male. Shift Workers and Travelers: People who work overnight or travel across time zones frequently are at higher risk of experiencing Circadian Rhythm Disorders. They are trying to work and function when their body is programed to sleep. Sleep Deprived: Lack of sleep has a serious impact on your ability to pay attention or focus on a task. Consistently getting less than the average of 8 hours your body needs creates partial or cumulative sleep deprivation. Untreated Sleep Disorders: Sleep Apnea, Narcolepsy, R.L.S., and other sleep disorders (untreated) prevent a person from getting enough restful sleep. This leads to excessive daytime sleepiness and increases the risk for drowsy driving accidents by up to 7 times.   Medications / Alcohol: Even over the counter medications can cause drowsiness. Medications that impair a drivers attention should have a warning label. Alcohol naturally makes you sleepy and on its own can cause accidents. Combined with excessive drowsiness its effects are amplified. Signs of Drowsy Driving:   * You don't remember driving the last few miles   * You may drift out of your dagmar   * You are unable to focus and your thoughts wander   * You may yawn more often than normal   * You have difficulty keeping your eyes open / nodding off   * Missing traffic signs, speeding, or tailgating  Prevention-   Good sleep hygiene, lifestyle and behavioral choices have the most impact on drowsy driving. There is no substitute for sleep and the average person requires 8 hours nightly. If you find yourself driving drowsy, stop and sleep. Consider the sleep hygiene tips provided during your visit as well. Medication Refill Policy: Refills for all medications require 1 week advance notice. Please have your pharmacy fax a refill request. We are unable to fax, or call in \"controled substance\" medications and you will need to pick these prescriptions up from our office. [email or web address] Activation    Thank you for requesting access to [email or web address]. Please follow the instructions below to securely access and download your online medical record. [email or web address] allows you to send messages to your doctor, view your test results, renew your prescriptions, schedule appointments, and more. How Do I Sign Up? 1. In your internet browser, go to [email or web address]. 2. Click on the First Time User? Click Here link in the Sign In box. You will see the New Member Sign Up page. 3. Enter your [employer] Access Code exactly as it appears below. You will not need to use this code after youve completed the sign-up process. If you do not sign up before the expiration date, you must request a new code. [employer] Access Code:  Activation code not generated  Current [employer] Status: Active (This is the date your [employer] access code will )    4. Enter the last four digits of your Social Security Number (xxxx) and Date of Birth (mm/dd/yyyy) as indicated and click Submit. You will be taken to the next sign-up page. 5. Create a [employer] ID. This will be your [employer] login ID and cannot be changed, so think of one that is secure and easy to remember. 6. Create a [employer] password. You can change your password at any time. 7. Enter your Password Reset Question and Answer. This can be used at a later time if you forget your password. 8. Enter your e-mail address. You will receive e-mail notification when new information is available in [address] 9. Click Sign Up. You can now view and download portions of your medical record. 10. Click the Download Summary menu link to download a portable copy of your medical information. Additional Information    If you have questions, please call [phone]. Remember, [employer] is NOT to be used for urgent needs. For medical emergencies, dial 911.

## 2022-03-19 PROBLEM — E66.01 OBESITY, MORBID (HCC): Status: ACTIVE | Noted: 2018-01-10

## 2022-03-19 PROBLEM — G47.33 OSA (OBSTRUCTIVE SLEEP APNEA): Status: ACTIVE | Noted: 2017-09-29

## 2022-08-04 ENCOUNTER — OFFICE VISIT (OUTPATIENT)
Dept: FAMILY MEDICINE CLINIC | Age: 52
End: 2022-08-04
Payer: COMMERCIAL

## 2022-08-04 VITALS
DIASTOLIC BLOOD PRESSURE: 77 MMHG | RESPIRATION RATE: 17 BRPM | TEMPERATURE: 98.6 F | WEIGHT: 280 LBS | SYSTOLIC BLOOD PRESSURE: 111 MMHG | OXYGEN SATURATION: 99 % | BODY MASS INDEX: 47.8 KG/M2 | HEIGHT: 64 IN | HEART RATE: 62 BPM

## 2022-08-04 DIAGNOSIS — G47.33 OSA (OBSTRUCTIVE SLEEP APNEA): ICD-10-CM

## 2022-08-04 DIAGNOSIS — R73.03 PREDIABETES: ICD-10-CM

## 2022-08-04 DIAGNOSIS — Z12.31 ENCOUNTER FOR SCREENING MAMMOGRAM FOR MALIGNANT NEOPLASM OF BREAST: ICD-10-CM

## 2022-08-04 DIAGNOSIS — E78.2 MIXED HYPERLIPIDEMIA: ICD-10-CM

## 2022-08-04 DIAGNOSIS — Z12.11 COLON CANCER SCREENING: ICD-10-CM

## 2022-08-04 DIAGNOSIS — E28.2 PCOS (POLYCYSTIC OVARIAN SYNDROME): Primary | ICD-10-CM

## 2022-08-04 DIAGNOSIS — E55.9 VITAMIN D DEFICIENCY: ICD-10-CM

## 2022-08-04 DIAGNOSIS — D50.0 IRON DEFICIENCY ANEMIA DUE TO CHRONIC BLOOD LOSS: ICD-10-CM

## 2022-08-04 DIAGNOSIS — E03.8 SUBCLINICAL HYPOTHYROIDISM: ICD-10-CM

## 2022-08-04 PROBLEM — M72.2 PLANTAR FASCIITIS OF RIGHT FOOT: Status: ACTIVE | Noted: 2020-08-03

## 2022-08-04 PROBLEM — K85.90 ACUTE PANCREATITIS: Status: ACTIVE | Noted: 2020-08-26

## 2022-08-04 PROBLEM — E61.8 IODINE DEFICIENCY: Status: ACTIVE | Noted: 2020-08-03

## 2022-08-04 PROBLEM — L65.9 LOSS OF HAIR: Status: ACTIVE | Noted: 2020-08-03

## 2022-08-04 PROBLEM — Z90.49 HISTORY OF CHOLECYSTECTOMY: Status: ACTIVE | Noted: 2020-08-26

## 2022-08-04 PROBLEM — D64.9 ANEMIA: Status: ACTIVE | Noted: 2020-08-03

## 2022-08-04 PROBLEM — E88.81 TYPE A INSULIN RESISTANCE: Status: ACTIVE | Noted: 2020-08-03

## 2022-08-04 LAB
25(OH)D3 SERPL-MCNC: 36.3 NG/ML (ref 30–100)
ALBUMIN SERPL-MCNC: 3.6 G/DL (ref 3.5–5)
ALBUMIN/GLOB SERPL: 0.9 {RATIO} (ref 1.1–2.2)
ALP SERPL-CCNC: 70 U/L (ref 45–117)
ALT SERPL-CCNC: 27 U/L (ref 12–78)
ANION GAP SERPL CALC-SCNC: 5 MMOL/L (ref 5–15)
AST SERPL-CCNC: 16 U/L (ref 15–37)
BILIRUB SERPL-MCNC: 0.5 MG/DL (ref 0.2–1)
BUN SERPL-MCNC: 12 MG/DL (ref 6–20)
BUN/CREAT SERPL: 13 (ref 12–20)
CALCIUM SERPL-MCNC: 10.1 MG/DL (ref 8.5–10.1)
CHLORIDE SERPL-SCNC: 108 MMOL/L (ref 97–108)
CHOLEST SERPL-MCNC: 139 MG/DL
CO2 SERPL-SCNC: 28 MMOL/L (ref 21–32)
CREAT SERPL-MCNC: 0.93 MG/DL (ref 0.55–1.02)
ERYTHROCYTE [DISTWIDTH] IN BLOOD BY AUTOMATED COUNT: 14.4 % (ref 11.5–14.5)
EST. AVERAGE GLUCOSE BLD GHB EST-MCNC: 126 MG/DL
GLOBULIN SER CALC-MCNC: 4.1 G/DL (ref 2–4)
GLUCOSE SERPL-MCNC: 84 MG/DL (ref 65–100)
HBA1C MFR BLD: 6 % (ref 4–5.6)
HCT VFR BLD AUTO: 36.6 % (ref 35–47)
HDLC SERPL-MCNC: 39 MG/DL
HDLC SERPL: 3.6 {RATIO} (ref 0–5)
HGB BLD-MCNC: 10.9 G/DL (ref 11.5–16)
LDLC SERPL CALC-MCNC: 84.4 MG/DL (ref 0–100)
MCH RBC QN AUTO: 24.3 PG (ref 26–34)
MCHC RBC AUTO-ENTMCNC: 29.8 G/DL (ref 30–36.5)
MCV RBC AUTO: 81.7 FL (ref 80–99)
NRBC # BLD: 0 K/UL (ref 0–0.01)
NRBC BLD-RTO: 0 PER 100 WBC
PLATELET # BLD AUTO: 307 K/UL (ref 150–400)
PMV BLD AUTO: 10.1 FL (ref 8.9–12.9)
POTASSIUM SERPL-SCNC: 4.4 MMOL/L (ref 3.5–5.1)
PROT SERPL-MCNC: 7.7 G/DL (ref 6.4–8.2)
RBC # BLD AUTO: 4.48 M/UL (ref 3.8–5.2)
SODIUM SERPL-SCNC: 141 MMOL/L (ref 136–145)
T4 FREE SERPL-MCNC: 1 NG/DL (ref 0.8–1.5)
TRIGL SERPL-MCNC: 78 MG/DL (ref ?–150)
TSH SERPL DL<=0.05 MIU/L-ACNC: 2.02 UIU/ML (ref 0.36–3.74)
VLDLC SERPL CALC-MCNC: 15.6 MG/DL
WBC # BLD AUTO: 7.3 K/UL (ref 3.6–11)

## 2022-08-04 PROCEDURE — 99204 OFFICE O/P NEW MOD 45 MIN: CPT | Performed by: STUDENT IN AN ORGANIZED HEALTH CARE EDUCATION/TRAINING PROGRAM

## 2022-08-04 RX ORDER — METFORMIN HYDROCHLORIDE 500 MG/1
TABLET, EXTENDED RELEASE ORAL
Qty: 180 TABLET | Refills: 1 | Status: SHIPPED | OUTPATIENT
Start: 2022-08-04

## 2022-08-04 RX ORDER — SPIRONOLACTONE 50 MG/1
50 TABLET, FILM COATED ORAL DAILY
Qty: 90 TABLET | Refills: 1 | Status: SHIPPED | OUTPATIENT
Start: 2022-08-04

## 2022-08-04 NOTE — PATIENT INSTRUCTIONS
GASTROENTEROLOGY:      Ryan Cummings, Dr. Milan Sandoval or Dr. Sanaz Mcarthur  [phone]      Olivia Tsai, Dr. Thompson Damon, Dr. Gaby Michael or Dr. Tomi [address], [place], [address] [facility]    [phone]      OB/GYN:      [facility] [place]    Dr. Alden Perkins (all female providers)    [facility] [place], [address] [place] [address]  [phone]    Lavon Ramos MD ([place])  [facility],  [address]   [phone] -[phone] [facility]   [phone]  [facility] [address], [address]   [phone]

## 2022-08-04 NOTE — PROGRESS NOTES
Name and  Verified. Previous PCP:  Dr. Sylvia Barrera, Latisha Mena MD    Pharmacy verified     Chief Complaint   Patient presents with    New Patient    Establish Care    Medication Refill     Patient fasting for labs, [facility]. Would like to discuss medications before refills. Has had  x 2 Prizer COVID vaccines, will bring card on next visit. 1. Have you been to the ER, urgent care clinic since your last visit? Hospitalized since your last visit? No    2. Have you seen or consulted any other health care providers outside of the [address] [place] [address] since your last visit? Include any pap smears or colon screening.  No      Health Maintenance Due   Topic Date Due    Hepatitis C Screening  Never done    Depression Screen  Never done    COVID-19 Vaccine (1) Never done    Colorectal Cancer Screening Combo  Never done    Shingrix Vaccine Age 50> (1 of 2) Never done    Breast Cancer Screen Mammogram  2020

## 2022-08-04 NOTE — PROGRESS NOTES
[address]  [address]i. [facility], [address]  [phone]    C/C: Chronic medical problems and establish care    HPI:    Bill Malcolm is a 47 y.o. BLACK/  female who presents to clinic today for evaluation of the issues listed above. Pt is new to the practice. Previous pcp: Dr. Deonte Collins and Dr. Seng Contreras. Subjective; Patient presenting for initial office visit with me today. Pt have a PMHx significant for PCOS, JESSY has CPAP, Vit D deficiency, Anemia, prediabetes, and GERD. Last saw pcp more than 2 years ago. Not currently taking prescribed meds. Previously on Metformin and spironolactone for PCOS. Would like to resume both. Was previously prescribed on an injection med (saxenda) for weight loss after doing keto diet and losing some weight. No on any meds at this time. Pt denies any  fever, chill, chest pain, SOB, abdominal pain, n/v/d, HA or dizziness. Other Health Habits and social history:  Occupation: Works in payroll.   Marital status: Has an daughter    Other Specialists/providers:  Dermatology     Allergies- reviewed:   No Known Allergies    Past Medical History- reviewed:  Past Medical History:   Diagnosis Date    GERD (gastroesophageal reflux disease) 11/24/2014    Obesity 11/24/2014    PCOS (polycystic ovarian syndrome) 11/24/2014       Family History - reviewed:  Family History   Problem Relation Age of Onset    Cancer Mother         clear cell    Hypertension Mother     Hypertension Father        Social History - reviewed:  Social History     Socioeconomic History    Marital status: SINGLE     Spouse name: Not on file    Number of children: Not on file    Years of education: Not on file    Highest education level: Not on file   Occupational History    Not on file   Tobacco Use    Smoking status: Never    Smokeless tobacco: Never   Vaping Use    Vaping Use: Never used   Substance and Sexual Activity    Alcohol use: Yes     Comment: Social    Drug use: No    Sexual activity: Not Currently   Other Topics Concern    Not on file   Social History Narrative    Not on file     Social Determinants of Health     Financial Resource Strain: Not on file   Food Insecurity: Not on file   Transportation Needs: Not on file   Physical Activity: Not on file   Stress: Not on file   Social Connections: Not on file   Intimate Partner Violence: Not on file   Housing Stability: Not on file       Depression screening:  3 most recent PHQ Screens 8/4/2022   Little interest or pleasure in doing things Not at all   Feeling down, depressed, irritable, or hopeless Not at all   Total Score PHQ 2 0   Trouble falling or staying asleep, or sleeping too much Not at all   Feeling tired or having little energy Not at all   Poor appetite, weight loss, or overeating Not at all   Feeling bad about yourself - or that you are a failure or have let yourself or your family down Not at all   Trouble concentrating on things such as school, work, reading, or watching TV Not at all   Moving or speaking so slowly that other people could have noticed; or the opposite being so fidgety that others notice Not at all   Thoughts of being better off dead, or hurting yourself in some way Not at all   PHQ 9 Score 0   How difficult have these problems made it for you to do your work, take care of your home and get along with others Not difficult at all         Review of systems:     A comprehensive review of systems was negative except for that written in the History of Present Illness. Visit Vitals  /77 (BP 1 Location: Right arm, BP Patient Position: Sitting, BP Cuff Size: Adult)   Pulse 62   Temp 98.6 °F (37 °C) (Oral)   Resp 17   Ht 5' 4\" (1.626 m)   Wt 280 lb (127 kg)   LMP 07/19/2022   SpO2 99%   BMI 48.06 kg/m²       General: Alert and oriented, in no acute distress. Well nourished. EYE: PERRL. Sclera and conjuctival clear.  Extraocular movements intact. EARS: External normal, canals clear, tympanic membranes normal.   NOSE: Mucosa healthy without drainage or ulceration. OROPHARYNX: No suspicious lesions, normal dentition, pharynx, tongue and tonsils normal.  NECK: Supple; no masses; thyroid normal.  LUNGS: Respirations unlabored; clear to auscultation bilaterally. CARDIOVASCULAR: Regular, rate, and rhythm without murmurs, gallops or rubs. ABDOMEN: Soft; nontender; nondistended; normoactive bowel sounds; no masses or organomegaly. MUSCULOSKELETAL: FROM in all extremities     EXT: No edema. Neurovascularlly intact. Normal gait. SKIN: No rash. No suspicious lesions or moles. Neuro: Mental Status: Pt is alert and oriented to person, place, and time. Assessment/Plan       ICD-10-CM ICD-9-CM    1. PCOS (polycystic ovarian syndrome)  E28.2 256.4 metFORMIN ER (GLUCOPHAGE XR) 500 mg tablet      spironolactone (ALDACTONE) 50 mg tablet      2. Prediabetes  R73.03 790.29 HEMOGLOBIN A1C WITH EAG      METABOLIC PANEL, COMPREHENSIVE      metFORMIN ER (GLUCOPHAGE XR) 500 mg tablet      HEMOGLOBIN A1C WITH EAG      METABOLIC PANEL, COMPREHENSIVE      3. Vitamin D deficiency  E55.9 268.9 VITAMIN D, 25 HYDROXY      VITAMIN D, 25 HYDROXY      4. Mixed hyperlipidemia  E78.2 272.2 LIPID PANEL      LIPID PANEL      5. Iron deficiency anemia due to chronic blood loss  D50.0 280.0 CBC W/O DIFF      CBC W/O DIFF      6. Subclinical hypothyroidism  E03.8 244.8 TSH 3RD GENERATION      T4, FREE      TSH 3RD GENERATION      T4, FREE      7. Encounter for screening mammogram for malignant neoplasm of breast  Z12.31 V76.12 [facility] MAMMO BI SCREENING INCL CAD      8. Colon cancer screening  Z12.11 V76.51 REFERRAL FOR COLONOSCOPY      9. JESSY (obstructive sleep apnea)  G47.33 327.23         1. PCOS (polycystic ovarian syndrome)    - metFORMIN ER (GLUCOPHAGE XR) 500 mg tablet; TAKE 2 TABS BY MOUTH DAILY (WITH DINNER). - spironolactone (ALDACTONE) 50 mg tablet;  Take 1 Tablet by mouth in the morning. 2. Prediabetes  - HEMOGLOBIN A1C WITH EAG; Future  - METABOLIC PANEL, COMPREHENSIVE; Future  - metFORMIN ER (GLUCOPHAGE XR) 500 mg tablet; TAKE 2 TABS BY MOUTH DAILY (WITH DINNER). - discussed lifestyle changes    3. Vitamin D deficiency  - VITAMIN D, 25 HYDROXY; Future    4. Mixed hyperlipidemia  - LIPID PANEL; Future    5. Iron deficiency anemia due to chronic blood loss  - CBC W/O DIFF; Future    6. Subclinical hypothyroidism  - TSH 3RD GENERATION; Future  - T4, FREE; Future    7. Encounter for screening mammogram for malignant neoplasm of breast  - [facility] MAMMO BI SCREENING INCL CAD; Future    8. Colon cancer screening  - REFERRAL FOR COLONOSCOPY    9. JESSY (obstructive sleep apnea)  Continue using CPAP      Follow up: 6 months or sooner if needed    I have discussed the diagnosis with the patient and the intended plan as seen in the above orders. The patient has received an after-visit summary and questions were answered concerning future plans. I have discussed medication side effects and warnings with the patient as well. Informed patient to return to the office if new symptoms arise.     Signed By: Rosann Meckel, MD     August 4, 2022

## 2022-12-23 ENCOUNTER — TELEPHONE (OUTPATIENT)
Dept: FAMILY MEDICINE CLINIC | Age: 52
End: 2022-12-23

## 2022-12-23 NOTE — TELEPHONE ENCOUNTER
[phone] ([place])   Left message for patient to schedule mammogram ordered by Dr. Araceli Ramsay on 08/2022. Requested patient to return call to panel manager at [phone]  to schedule mammogram or notify that mammogram   has been completed at an outside facility.

## 2023-05-17 RX ORDER — SPIRONOLACTONE 50 MG/1
50 TABLET, FILM COATED ORAL DAILY
COMMUNITY
Start: 2022-08-04

## 2023-05-17 RX ORDER — METFORMIN HYDROCHLORIDE 500 MG/1
TABLET, EXTENDED RELEASE ORAL
COMMUNITY
Start: 2022-08-04

## 2023-10-02 ENCOUNTER — TRANSCRIBE ORDERS (OUTPATIENT)
Facility: HOSPITAL | Age: 53
End: 2023-10-02

## 2023-10-02 DIAGNOSIS — Z12.31 SCREENING MAMMOGRAM FOR HIGH-RISK PATIENT: Primary | ICD-10-CM
